# Patient Record
Sex: FEMALE | Race: BLACK OR AFRICAN AMERICAN | NOT HISPANIC OR LATINO | Employment: UNEMPLOYED | URBAN - METROPOLITAN AREA
[De-identification: names, ages, dates, MRNs, and addresses within clinical notes are randomized per-mention and may not be internally consistent; named-entity substitution may affect disease eponyms.]

---

## 2018-06-12 ENCOUNTER — APPOINTMENT (OUTPATIENT)
Dept: GENERAL RADIOLOGY | Facility: CLINIC | Age: 28
End: 2018-06-12
Attending: EMERGENCY MEDICINE
Payer: COMMERCIAL

## 2018-06-12 ENCOUNTER — HOSPITAL ENCOUNTER (EMERGENCY)
Facility: CLINIC | Age: 28
Discharge: HOME | End: 2018-06-12
Attending: EMERGENCY MEDICINE | Admitting: EMERGENCY MEDICINE
Payer: COMMERCIAL

## 2018-06-12 VITALS
BODY MASS INDEX: 34.66 KG/M2 | OXYGEN SATURATION: 97 % | TEMPERATURE: 98.8 F | DIASTOLIC BLOOD PRESSURE: 71 MMHG | RESPIRATION RATE: 18 BRPM | HEART RATE: 80 BPM | HEIGHT: 69 IN | WEIGHT: 234 LBS | SYSTOLIC BLOOD PRESSURE: 116 MMHG

## 2018-06-12 DIAGNOSIS — R07.9 ACUTE CHEST PAIN: ICD-10-CM

## 2018-06-12 LAB
ANION GAP SERPL CALCULATED.3IONS-SCNC: 9 MMOL/L (ref 3–14)
BASOPHILS # BLD AUTO: 0 10E9/L (ref 0–0.2)
BASOPHILS NFR BLD AUTO: 0.2 %
BUN SERPL-MCNC: 11 MG/DL (ref 7–30)
CALCIUM SERPL-MCNC: 8.4 MG/DL (ref 8.5–10.1)
CHLORIDE SERPL-SCNC: 108 MMOL/L (ref 94–109)
CO2 SERPL-SCNC: 24 MMOL/L (ref 20–32)
CREAT SERPL-MCNC: 0.83 MG/DL (ref 0.52–1.04)
D DIMER PPP FEU-MCNC: 0.3 UG/ML FEU (ref 0–0.5)
DIFFERENTIAL METHOD BLD: NORMAL
EOSINOPHIL # BLD AUTO: 0.1 10E9/L (ref 0–0.7)
EOSINOPHIL NFR BLD AUTO: 1.4 %
ERYTHROCYTE [DISTWIDTH] IN BLOOD BY AUTOMATED COUNT: 12.7 % (ref 10–15)
GFR SERPL CREATININE-BSD FRML MDRD: 83 ML/MIN/1.7M2
GLUCOSE SERPL-MCNC: 112 MG/DL (ref 70–99)
HCT VFR BLD AUTO: 36.9 % (ref 35–47)
HGB BLD-MCNC: 12.4 G/DL (ref 11.7–15.7)
IMM GRANULOCYTES # BLD: 0 10E9/L (ref 0–0.4)
IMM GRANULOCYTES NFR BLD: 0.4 %
INTERPRETATION ECG - MUSE: NORMAL
LYMPHOCYTES # BLD AUTO: 3.1 10E9/L (ref 0.8–5.3)
LYMPHOCYTES NFR BLD AUTO: 32 %
MCH RBC QN AUTO: 30 PG (ref 26.5–33)
MCHC RBC AUTO-ENTMCNC: 33.6 G/DL (ref 31.5–36.5)
MCV RBC AUTO: 89 FL (ref 78–100)
MONOCYTES # BLD AUTO: 1 10E9/L (ref 0–1.3)
MONOCYTES NFR BLD AUTO: 10.3 %
NEUTROPHILS # BLD AUTO: 5.3 10E9/L (ref 1.6–8.3)
NEUTROPHILS NFR BLD AUTO: 55.7 %
NRBC # BLD AUTO: 0 10*3/UL
NRBC BLD AUTO-RTO: 0 /100
PLATELET # BLD AUTO: 294 10E9/L (ref 150–450)
POTASSIUM SERPL-SCNC: 3.6 MMOL/L (ref 3.4–5.3)
RBC # BLD AUTO: 4.13 10E12/L (ref 3.8–5.2)
SODIUM SERPL-SCNC: 141 MMOL/L (ref 133–144)
TROPONIN I SERPL-MCNC: <0.015 UG/L (ref 0–0.04)
TROPONIN I SERPL-MCNC: <0.015 UG/L (ref 0–0.04)
WBC # BLD AUTO: 9.5 10E9/L (ref 4–11)

## 2018-06-12 PROCEDURE — 80048 BASIC METABOLIC PNL TOTAL CA: CPT | Performed by: EMERGENCY MEDICINE

## 2018-06-12 PROCEDURE — 25000132 ZZH RX MED GY IP 250 OP 250 PS 637: Performed by: EMERGENCY MEDICINE

## 2018-06-12 PROCEDURE — 71046 X-RAY EXAM CHEST 2 VIEWS: CPT

## 2018-06-12 PROCEDURE — 85025 COMPLETE CBC W/AUTO DIFF WBC: CPT | Performed by: EMERGENCY MEDICINE

## 2018-06-12 PROCEDURE — 99285 EMERGENCY DEPT VISIT HI MDM: CPT | Mod: 25

## 2018-06-12 PROCEDURE — 85379 FIBRIN DEGRADATION QUANT: CPT | Performed by: EMERGENCY MEDICINE

## 2018-06-12 PROCEDURE — 84484 ASSAY OF TROPONIN QUANT: CPT | Performed by: EMERGENCY MEDICINE

## 2018-06-12 PROCEDURE — 93005 ELECTROCARDIOGRAM TRACING: CPT

## 2018-06-12 RX ORDER — ASPIRIN 81 MG/1
324 TABLET, CHEWABLE ORAL ONCE
Status: COMPLETED | OUTPATIENT
Start: 2018-06-12 | End: 2018-06-12

## 2018-06-12 RX ADMIN — ASPIRIN 81 MG 324 MG: 81 TABLET ORAL at 19:08

## 2018-06-12 ASSESSMENT — ENCOUNTER SYMPTOMS
PALPITATIONS: 0
DIAPHORESIS: 0
NAUSEA: 0
LIGHT-HEADEDNESS: 0
VOMITING: 0
COUGH: 0

## 2018-06-12 NOTE — ED PROVIDER NOTES
History     Chief Complaint:  Chest pain    HPI   Zi Mirza is an otherwise healthy 27 year old female who presents with chest pain. The patient reports an onset of pleuritic, localized, and left sided-chest pain about 40 minutes ago. She states the pain feels sharp like a pinch or a needle in her chest. No known alleviating or exacerbating factors. Patient's pain has improved since she arrived to the emergency department but is currently still experiencing some pain. She did not take any medications prior to evaluation. Denies nausea, vomiting, lightheadedness, palpitations, diaphoresis, leg swelling, hemoptysis. Denies personal or familial history of blood clot or cancer. No history of heart disease. Of note, patient is on Nexplanon.     Cardiac/PE/DVT Risk Factors:  The patient does not have a history of hypertension, hyperlipidemia, diabetes, smoking. She reports no family history of cardiac disease. The patient doesn't have any personal or familial history of PE, DVT, or clotting disorder. Patient is currently on Nexplanon. The patient reports no recent travel, surgery, or other immobilizations.     Allergies:  No known drug allergies     Medications:    Nexplanon SC    Past Medical History:    The patient does not have any past pertinent medical history.     Past Surgical History:    History reviewed. No pertinent surgical history.     Family History:    History reviewed. No pertinent family history.      Social History:  Smoking status: Never smoker  Alcohol use: No   Marital Status:  Single [1]    Review of Systems   Constitutional: Negative for diaphoresis.   Respiratory: Negative for cough.    Cardiovascular: Positive for chest pain. Negative for palpitations and leg swelling.   Gastrointestinal: Negative for nausea and vomiting.   Neurological: Negative for light-headedness.   All other systems reviewed and are negative.    Physical Exam   Patient Vitals for the past 24 hrs:   BP Temp Temp src  "Heart Rate Resp SpO2 Height Weight   06/12/18 2000 114/59 - - 95 18 97 % - -   06/12/18 1825 142/82 98.8  F (37.1  C) Oral 98 18 98 % 1.753 m (5' 9\") 106.1 kg (234 lb)      Physical Exam  General: Well-nourished  Eyes: PERRL, conjunctivae pink no scleral icterus or conjunctival injection  ENT:  Moist mucus membranes, posterior oropharynx clear without erythema or exudates  Respiratory:  Lungs clear to auscultation bilaterally, no crackles/rubs/wheezes.  Good air movement  CV: Normal rate and rhythm, no murmurs/rubs/gallops  GI:  Abdomen soft and non-distended.  Normoactive BS.  No tenderness, guarding or rebound  Skin: Warm, dry.  No rashes or petechiae  Musculoskeletal: No peripheral edema or calf tenderness  Neuro: Alert and oriented to person/place/time  Psychiatric: Anxious affect      Emergency Department Course   ECG (18:28:16)  Indication: Chest pain.  Normal sinus rhythm with sinus arrhythmia. Normal ECG.  Agree with computer interpretation.   Read at 1828.   Rate 93 bpm. IA interval 152. QRS duration 82. QT/QTc 348/432. P-R-T axes 60 40 39.     Imaging:  Radiographic findings were communicated with the patient who voiced understanding of the findings.  Chest XR, PA & LAT  Heart and pulmonary vessels within normal limits. Lungs clear. No  pleural effusion.. The left apex is slightly more lucent than the  right. No pneumothorax. Possibly bullous change at the left apex.   As read by Radiology.     Laboratory:  CBC: WNL (WBC 9.5, HGB 12.4, )    BMP: Glucose 112 (H), Calcium 8.4 (L) WNL (Creatinine 0.83)   Troponin (1858): <0.015   D-dimer (1858): 0.3    Interventions:  1908: Aspirin chewable tablet 324 mg oral     Emergency Department Course:  1828: ECG obtained, findings as noted above.    Past medical records, nursing notes, and vitals reviewed.  1840: I performed an exam of the patient and obtained history, as documented above.   1858: IV inserted and blood drawn for basic laboratory. Troponin and " D-dimer obtained. Results as noted above.    1908: Patient given Aspirin here in the emergency department.  The patient was sent for a chest XR while in the emergency department, findings above.   2144: I rechecked the patient and explained the findings to the patient.   Patient discharged home with instructions regarding supportive care, medications, and reasons to return. The importance of close follow-up was reviewed.      Impression & Plan    Medical Decision Making:    HEART Score  Criteria   0-2 points for each of 5 items (maximum of 10 points):  Score 0- History slightly suspicious for coronary syndrome  Score 0- EKG Normal  Score 0- Age <45 years old  Score 0- No risk factors for atherosclerotic disease  Score 0- Within normal limits for troponin levels  Interpretation  Risk of adverse outcome  Heart Score: 0  Total Score 0-3- Adverse Outcome Risk 2.5% - Supports early discharge with appropriate follow-up    Zi Mirza is a 27 year old female presented with chest pain. Initial laboratory and imaging tests have come back normal.  Delta troponin is negative.  The patients symptoms have improved with interventions in the Emergency Department and are very atypical for coronary ischemia. Ddimer is negative and there is no clinical, laboratory, or radiographic evidence of pulmonary embolism, aortic dissection or cardiac ischemia.  Given the low HEART score, I feel the candidate is appropriate for discharge with outpatient PMD follow-up. She has agreed to follow-up with the stress test and primary doctor and return if any worsening.    An incidental finding of a possible bleb was noted on imaging.  I alerted the patient, gave copies of CXR results and discussed with her the possibility this could increase her risk for a spontaneous pneumothorax in the future.    Diagnosis:    ICD-10-CM   1. Acute chest pain R07.9     Disposition:  discharged to home    Tricia Lambert  6/12/2018    EMERGENCY DEPARTMENT  I, Tricia Lambert,  am serving as a scribe at 6:40 PM on 6/12/2018 to document services personally performed by Kimmy Candelaria MD based on my observations and the provider's statements to me.       Kimmy Candelaria MD  06/12/18 3460

## 2018-06-12 NOTE — ED AVS SNAPSHOT
Emergency Department    [address] [facility] [address]    Phone:  [phone]    Fax:  [phone]                                       Zi Mirza   MRN: [id number]    Department:   Emergency Department   Date of Visit:  6/12/2018           Patient Information     Date Of Birth          1990        Your diagnoses for this visit were:     Acute chest pain        You were seen by Kimmy Candelaria MD.      Follow-up Information     Follow up with Kolby Leahy MD. Schedule an appointment as soon as possible for a visit in 3 days.    Specialty:  Family Practice    Contact information:    [address] [facility] [address] [phone]          Discharge Instructions       *You may resume diet and activities as tolerated.  *No new medications.  *Follow-up with your doctor for a recheck in 2-3 days.   *Return if you develop difficulty in breathing, faint or feel like you will faint or become worse in any way.    Discharge Instructions  Chest Pain    You have been seen today for chest pain or discomfort.  At this time, your provider has found no signs that your chest pain is due to a serious or life-threatening condition, (or you have declined more testing and/or admission to the hospital). However, sometimes there is a serious problem that does not show up right away. Your evaluation today may not be complete and you may need further testing and evaluation.     Generally, every Emergency Department visit should have a follow-up clinic visit with either a primary or a specialty clinic/provider. Please follow-up as instructed by your emergency provider today.  Return to the Emergency Department if:    Your chest pain changes, gets worse, starts to happen more often, or comes with less activity.    You are newly short of breath.    You get very weak or tired.    You pass out or faint.    You have any new symptoms, like fever, cough, numb legs, or you cough up blood.    You have  anything else that worries you.    Until you follow-up with your regular provider, please do the following:    Take one aspirin daily unless you have an allergy or are told not to by your provider.    If a stress test appointment has been made, go to the appointment.    If you have questions, contact your regular provider.    Follow-up with your regular provider/clinic as directed; this is very important.    If you were given a prescription for medicine here today, be sure to read all of the information (including the package insert) that comes with your prescription.  This will include important information about the medicine, its side effects, and any warnings that you need to know about.  The pharmacist who fills the prescription can provide more information and answer questions you may have about the medicine.  If you have questions or concerns that the pharmacist cannot address, please call or return to the Emergency Department.       Remember that you can always come back to the Emergency Department if you are not able to see your regular provider in the amount of time listed above, if you get any new symptoms, or if there is anything that worries you.        24 Hour Appointment Hotline       To make an appointment at [facility], call [phone] ([phone]). If you don't have a family doctor or clinic, we will help you find one. [facility] are conveniently located to serve the needs of you and your family.             Review of your medicines      Our records show that you are taking the medicines listed below. If these are incorrect, please call your family doctor or clinic.        Dose / Directions Last dose taken    NEXPLANON SC        Refills:  0        UNABLE TO FIND        MEDICATION NAME: CLA   Refills:  0                Procedures and tests performed during your visit     Basic metabolic panel    CBC with platelets differential    Cardiac Continuous Monitoring    Chest XR,  PA &  LAT    D dimer quantitative    EKG 12 lead    Peripheral IV: Standard    Pulse oximetry nursing    Review medications with patient    Troponin I    Troponin I (now)    Vital signs      Orders Needing Specimen Collection     None      Pending Results     No orders found from 6/10/2018 to 6/13/2018.            Pending Culture Results     No orders found from 6/10/2018 to 6/13/2018.            Pending Results Instructions     If you had any lab results that were not finalized at the time of your Discharge, you can call the ED Lab Result RN at [phone]. You will be contacted by this team for any positive Lab results or changes in treatment. The nurses are available 7 days a week from 10A to 6:30P.  You can leave a message 24 hours per day and they will return your call.        Test Results From Your Hospital Stay        6/12/2018  7:06 PM      Component Results     Component Value Ref Range & Units Status    WBC 9.5 4.0 - 11.0 10e9/L Final    RBC Count 4.13 3.8 - 5.2 10e12/L Final    Hemoglobin 12.4 11.7 - 15.7 g/dL Final    Hematocrit 36.9 35.0 - 47.0 % Final    MCV 89 78 - 100 fl Final    MCH 30.0 26.5 - 33.0 pg Final    MCHC 33.6 31.5 - 36.5 g/dL Final    RDW 12.7 10.0 - 15.0 % Final    Platelet Count 294 150 - 450 10e9/L Final    Diff Method Automated Method  Final    % Neutrophils 55.7 % Final    % Lymphocytes 32.0 % Final    % Monocytes 10.3 % Final    % Eosinophils 1.4 % Final    % Basophils 0.2 % Final    % Immature Granulocytes 0.4 % Final    Nucleated RBCs 0 0 /100 Final    Absolute Neutrophil 5.3 1.6 - 8.3 10e9/L Final    Absolute Lymphocytes 3.1 0.8 - 5.3 10e9/L Final    Absolute Monocytes 1.0 0.0 - 1.3 10e9/L Final    Absolute Eosinophils 0.1 0.0 - 0.7 10e9/L Final    Absolute Basophils 0.0 0.0 - 0.2 10e9/L Final    Abs Immature Granulocytes 0.0 0 - 0.4 10e9/L Final    Absolute Nucleated RBC 0.0  Final         6/12/2018  7:58 PM      Component Results     Component Value Ref Range & Units Status     Sodium 141 133 - 144 mmol/L Final    Potassium 3.6 3.4 - 5.3 mmol/L Final    Chloride 108 94 - 109 mmol/L Final    Carbon Dioxide 24 20 - 32 mmol/L Final    Anion Gap 9 3 - 14 mmol/L Final    Glucose 112 (H) 70 - 99 mg/dL Final    Urea Nitrogen 11 7 - 30 mg/dL Final    Creatinine 0.83 0.52 - 1.04 mg/dL Final    GFR Estimate 83 >60 mL/min/1.7m2 Final    Non  GFR Calc    GFR Estimate If Black >90 >60 mL/min/1.7m2 Final    African American GFR Calc    Calcium 8.4 (L) 8.5 - 10.1 mg/dL Final         6/12/2018  7:58 PM      Component Results     Component Value Ref Range & Units Status    Troponin I ES <0.015 0.000 - 0.045 ug/L Final    The 99th percentile for upper reference range is 0.045 ug/L.  Troponin values   in the range of 0.045 - 0.120 ug/L may be associated with risks of adverse   clinical events.           6/12/2018  7:26 PM      Component Results     Component Value Ref Range & Units Status    D Dimer 0.3 0.0 - 0.50 ug/ml FEU Final    This D-dimer assay is intended for use in conjunction with a clinical pretest   probability assessment model to exclude pulmonary embolism (PE) and deep   venous thrombosis (DVT) in outpatients suspected of PE or DVT. The cut-off   value is 0.5 ug/mL FEU.           6/12/2018  9:11 PM      Narrative     CHEST TWO VIEWS  6/12/2018 8:50 PM     HISTORY: chest pain;     COMPARISON: None.        Impression     IMPRESSION:   Heart and pulmonary vessels within normal limits. Lungs clear. No  pleural effusion.. The left apex is slightly more lucent than the  right. No pneumothorax. Possibly bullous change at the left apex.     ISAK NARVAEZ MD         6/12/2018  9:45 PM      Component Results     Component Value Ref Range & Units Status    Troponin I ES <0.015 0.000 - 0.045 ug/L Final    The 99th percentile for upper reference range is 0.045 ug/L.  Troponin values   in the range of 0.045 - 0.120 ug/L may be associated with risks of adverse   clinical events.                   Clinical Quality Measure: Blood Pressure Screening     Your blood pressure was checked while you were in the emergency department today. The last reading we obtained was  BP: 114/59 . Please read the guidelines below about what these numbers mean and what you should do about them.  If your systolic blood pressure (the top number) is less than 120 and your diastolic blood pressure (the bottom number) is less than 80, then your blood pressure is normal. There is nothing more that you need to do about it.  If your systolic blood pressure (the top number) is 120-139 or your diastolic blood pressure (the bottom number) is 80-89, your blood pressure may be higher than it should be. You should have your blood pressure rechecked within a year by a primary care provider.  If your systolic blood pressure (the top number) is 140 or greater or your diastolic blood pressure (the bottom number) is 90 or greater, you may have high blood pressure. High blood pressure is treatable, but if left untreated over time it can put you at risk for heart attack, stroke, or kidney failure. You should have your blood pressure rechecked by a primary care provider within the next 4 weeks.  If your provider in the emergency department today gave you specific instructions to follow-up with your doctor or provider even sooner than that, you should follow that instruction and not wait for up to 4 weeks for your follow-up visit.        Thank you for choosing [facility]       Thank you for choosing [facility] for your care. Our goal is always to provide you with excellent care. Hearing back from our patients is one way we can continue to improve our services. Please take a few minutes to complete the written survey that you may receive in the mail after you visit with us. Thank you!        [employer] Information     [email or web address] lets you send messages to your doctor, view your test results, renew your prescriptions, schedule appointments and more. To sign  "up, go to [email or web address] . Click on \"Log in\" on the left side of the screen, which will take you to the Welcome page. Then click on \"Sign up Now\" on the right side of the page.     You will be asked to enter the access code listed below, as well as some personal information. Please follow the directions to create your username and password.     Your access code is: [email or web address]  Expires: 9/10/2018  9:50 PM     Your access code will  in 90 days. If you need help or a new code, please call your [facility] clinic or [phone].        Care EveryWhere ID     This is your Care EveryWhere ID. This could be used by other organizations to access your [facility] medical records  [id number]        Equal Access to Services     EARNESTINE SERVIN : Giselle Seymour, elizabeth dc, parveen marina, monica ramos. So [facility] [phone].    ATENCIÓN: Si habla español, tiene a tom disposición servicios gratuitos de asistencia lingüística. Omer al [phone].    We comply with applicable federal civil rights laws and [place] laws. We do not discriminate on the basis of race, color, national origin, age, disability, sex, sexual orientation, or gender identity.            After Visit Summary       This is your record. Keep this with you and show to your community pharmacist(s) and doctor(s) at your next visit.                  "

## 2018-06-12 NOTE — ED AVS SNAPSHOT
Emergency Department    [address] [place] [address]    Phone:  [phone]    Fax:  [phone]                                       Zi Mirza   MRN: [id number]    Department:   Emergency Department   Date of Visit:  6/12/2018           After Visit Summary Signature Page     I have received my discharge instructions, and my questions have been answered. I have discussed any challenges I see with this plan with the nurse or doctor.    ..........................................................................................................................................  Patient/Patient Representative Signature      ..........................................................................................................................................  Patient Representative Print Name and Relationship to Patient    ..................................................               ................................................  Date                                            Time    ..........................................................................................................................................  Reviewed by Signature/Title    ...................................................              ..............................................  Date                                                            Time

## 2018-06-13 NOTE — DISCHARGE INSTRUCTIONS
*You may resume diet and activities as tolerated.  *No new medications.  *Follow-up with your doctor for a recheck in 2-3 days.   *Return if you develop difficulty in breathing, faint or feel like you will faint or become worse in any way.    Discharge Instructions  Chest Pain    You have been seen today for chest pain or discomfort.  At this time, your provider has found no signs that your chest pain is due to a serious or life-threatening condition, (or you have declined more testing and/or admission to the hospital). However, sometimes there is a serious problem that does not show up right away. Your evaluation today may not be complete and you may need further testing and evaluation.     Generally, every Emergency Department visit should have a follow-up clinic visit with either a primary or a specialty clinic/provider. Please follow-up as instructed by your emergency provider today.  Return to the Emergency Department if:    Your chest pain changes, gets worse, starts to happen more often, or comes with less activity.    You are newly short of breath.    You get very weak or tired.    You pass out or faint.    You have any new symptoms, like fever, cough, numb legs, or you cough up blood.    You have anything else that worries you.    Until you follow-up with your regular provider, please do the following:    Take one aspirin daily unless you have an allergy or are told not to by your provider.    If a stress test appointment has been made, go to the appointment.    If you have questions, contact your regular provider.    Follow-up with your regular provider/clinic as directed; this is very important.    If you were given a prescription for medicine here today, be sure to read all of the information (including the package insert) that comes with your prescription.  This will include important information about the medicine, its side effects, and any warnings that you need to know about.  The pharmacist who  fills the prescription can provide more information and answer questions you may have about the medicine.  If you have questions or concerns that the pharmacist cannot address, please call or return to the Emergency Department.       Remember that you can always come back to the Emergency Department if you are not able to see your regular provider in the amount of time listed above, if you get any new symptoms, or if there is anything that worries you.

## 2018-11-22 ENCOUNTER — HOSPITAL ENCOUNTER (EMERGENCY)
Facility: CLINIC | Age: 28
Discharge: HOME | End: 2018-11-22
Attending: EMERGENCY MEDICINE | Admitting: EMERGENCY MEDICINE
Payer: COMMERCIAL

## 2018-11-22 VITALS
DIASTOLIC BLOOD PRESSURE: 89 MMHG | OXYGEN SATURATION: 98 % | SYSTOLIC BLOOD PRESSURE: 131 MMHG | WEIGHT: 230 LBS | RESPIRATION RATE: 16 BRPM | BODY MASS INDEX: 34.07 KG/M2 | HEART RATE: 90 BPM | TEMPERATURE: 99 F | HEIGHT: 69 IN

## 2018-11-22 DIAGNOSIS — J02.9 ACUTE PHARYNGITIS, UNSPECIFIED ETIOLOGY: ICD-10-CM

## 2018-11-22 LAB
DEPRECATED S PYO AG THROAT QL EIA: NORMAL
SPECIMEN SOURCE: NORMAL

## 2018-11-22 PROCEDURE — 87880 STREP A ASSAY W/OPTIC: CPT | Performed by: EMERGENCY MEDICINE

## 2018-11-22 PROCEDURE — 25000131 ZZH RX MED GY IP 250 OP 636 PS 637: Performed by: EMERGENCY MEDICINE

## 2018-11-22 PROCEDURE — 25000132 ZZH RX MED GY IP 250 OP 250 PS 637: Performed by: EMERGENCY MEDICINE

## 2018-11-22 PROCEDURE — 87081 CULTURE SCREEN ONLY: CPT | Performed by: EMERGENCY MEDICINE

## 2018-11-22 PROCEDURE — 99283 EMERGENCY DEPT VISIT LOW MDM: CPT

## 2018-11-22 RX ORDER — IBUPROFEN 600 MG/1
600 TABLET, FILM COATED ORAL EVERY 6 HOURS PRN
Qty: 30 TABLET | Refills: 0 | Status: SHIPPED | OUTPATIENT
Start: 2018-11-22 | End: 2018-11-25

## 2018-11-22 RX ORDER — IBUPROFEN 600 MG/1
600 TABLET, FILM COATED ORAL ONCE
Status: COMPLETED | OUTPATIENT
Start: 2018-11-22 | End: 2018-11-22

## 2018-11-22 RX ADMIN — DEXAMETHASONE 10 MG: 2 TABLET ORAL at 07:54

## 2018-11-22 RX ADMIN — IBUPROFEN 600 MG: 600 TABLET, FILM COATED ORAL at 07:54

## 2018-11-22 ASSESSMENT — ENCOUNTER SYMPTOMS
COUGH: 1
SORE THROAT: 1
FEVER: 0

## 2018-11-22 NOTE — ED AVS SNAPSHOT
Emergency Department    [address] [place] [address]    Phone:  [phone]    Fax:  [phone]                                       Zi Mirza   MRN: [id number]    Department:   Emergency Department   Date of Visit:  11/22/2018           After Visit Summary Signature Page     I have received my discharge instructions, and my questions have been answered. I have discussed any challenges I see with this plan with the nurse or doctor.    ..........................................................................................................................................  Patient/Patient Representative Signature      ..........................................................................................................................................  Patient Representative Print Name and Relationship to Patient    ..................................................               ................................................  Date                                   Time    ..........................................................................................................................................  Reviewed by Signature/Title    ...................................................              ..............................................  Date                                               Time          22EPIC Rev 08/18

## 2018-11-22 NOTE — ED PROVIDER NOTES
"  History     Chief Complaint:  Pharyngitis     HPI   Zi Mirza is an otherwise healthy 27 year old female who presents for evaluation of a sore throat. The patient reports for the past 3 days, she has been experiencing a sore throat and lozenges haven't been helping. She reports she has not been around any sick. The patient also complains of a cough, but denies any fevers. She denies any chance of pregnancy.     Allergies:  No known drug allergies    Medications:    Nexplanon     Past Medical History:    History reviewed. No pertinent past medical history.    Past Surgical History:    History reviewed. No pertinent surgical history.    Family History:    No family history on file.    Social History:  Smoking status: No  Alcohol use: No  Marital Status:  Single [1]     Review of Systems   Constitutional: Negative for fever.   HENT: Positive for sore throat.    Respiratory: Positive for cough.    All other systems reviewed and are negative.    Physical Exam     Patient Vitals for the past 24 hrs:   BP Temp Temp src Pulse Resp SpO2 Height Weight   11/22/18 0735 131/89 99  F (37.2  C) Oral 90 16 98 % 1.753 m (5' 9\") 104.3 kg (230 lb)       Physical Exam  General: Alert, interactive in mild distress  Head:  Scalp is atraumatic  Eyes:  The pupils are equal, round, and reactive to light    EOM's intact    No scleral icterus  ENT:      Nose:  The external nose is normal  Ears:  External ears are normal  Mouth/Throat: The oropharynx is normal. Mild pharyngeal erythema     Mucus membranes are moist       Neck:  Normal range of motion.      There is no rigidity.    Trachea is in the midline         CV:  Regular rate and rhythm    No murmur   Resp:  Breath sounds are clear bilaterally    Non-labored, no retractions or accessory muscle use     MS:  Normal strength in all 4 extremities  Skin:  Warm and dry, No rash or lesions noted.  Neuro: Strength 5/5 x4.   Psych:  Awake. Alert.  Normal affect.      Appropriate " interactions.    Emergency Department Course     Laboratory:  Rapid strep: Negative  Culture: in process    Interventions:  0754: Decadron 10 mg oral  0754: Ibuprofen 600 mg oral     Emergency Department Course:  Past medical records, nursing notes, and vitals reviewed.  0745: I performed an exam of the patient and obtained history, as documented above.    0803: I rechecked the patient. Findings and plan explained to the Patient. Patient discharged home with instructions regarding supportive care, medications, and reasons to return. The importance of close follow-up was reviewed.     Impression & Plan      Medical Decision Making:  Zi Mirza is a 27 year old female who presents with sore throat and clinical evidence of pharyngitis.  The rapid strep test is negative, and formal culture has been set up in the lab. There is no clinical evidence of peritonsillar abscess, retropharyngeal abscess, Lemierre's Syndrome, epiglottis, or Jcarlos's angina. The etiology is most likely viral.   I have recommended treatment with analgesics, and we will await formal culture results.  If the culture is positive, an ED physician will call the patient to initiate anti-microbial therapy. Return if increasing pain, change in voice, neck pain, vomiting, fever, or shortness of breath. Follow-up with primary physician if not improving in 3-5 days. Given her well appearance, I would not test further for other etiologies of serious bacterial infections.     Diagnosis:    ICD-10-CM    1. Acute pharyngitis, unspecified etiology J02.9      Disposition:  discharged to home    Discharge Medications:  New Prescriptions   IBUPROFEN (ADVIL/MOTRIN) 600 MG TABLET  Take 1 tablet (600 mg) by mouth every 6 hours as needed for moderate pain     Candice Beck  11/22/2018    EMERGENCY DEPARTMENT  Candice FOY am serving as a scribe at 7:45 AM on 11/22/2018 to document services personally performed by Zeb Hooks MD based on my  observations and the provider's statements to me.        Zeb Hooks MD  11/22/18 9749

## 2018-11-22 NOTE — ED AVS SNAPSHOT
Emergency Department    [address] [facility] [address]    Phone:  [phone]    Fax:  [phone]                                       Zi Mirza   MRN: [id number]    Department:   Emergency Department   Date of Visit:  11/22/2018           Patient Information     Date Of Birth          1990        Your diagnoses for this visit were:     Acute pharyngitis, unspecified etiology        You were seen by Zeb Hooks MD.      Follow-up Information     Follow up with No Ref-Primary, Physician In 1 week.    Why:  Primary Provider        Follow up with  Emergency Department.    Specialty:  EMERGENCY MEDICINE    Why:  As needed, If symptoms worsen    Contact information:    [address] [facility] [address] [phone]        Discharge Instructions       Discharge Instructions  Sore Throat  You were seen today for a sore throat.  Most (>80%) sore throats are caused by a virus. Antibiotics do not help with viral infections, but you can fight off the virus on your own.  In this case, your sore throat would be treated with medications for your pain and fever.    Strep throat is a kind of sore throat caused by Group A streptococcus bacteria.  This type of sore throat is treated with antibiotics.  If you had a rapid test done today for strep throat and it did not show infection, a culture is done in some cases. The culture can take several days to complete. If the culture shows you have strep throat, we will call you and get you a prescription for antibiotics. We will not contact you with a negative culture result.  Generally, every Emergency Department visit should have a follow-up clinic visit with either a primary or a specialty clinic/provider. Please follow-up as instructed by your emergency provider today.  Return to the Emergency Department if:    If you have difficulty breathing.    If you are drooling because you are unable to swallow.    You  become dehydrated due to difficulty drinking. Signs of dehydration include weakness, dry mouth, and urinating less than 3 times per day.    If you develop swelling of the neck or tongue.    If you develop a high fever with either severe or unusual headache or stiff neck.    Treatment:      Pain relief -- Non-prescription pain medications, such as Tylenol  (acetaminophen) or Motrin , Advil  (ibuprofen) are usually recommended for pain.  Do not use a medicine that you are allergic to, or if your provider has told you not to use it.    Soft or liquid diet. Concentrate on liquids to keep yourself hydrated. Cold liquids (popsicles, ice cream, etc.) may feel good on your throat.  If you were given a prescription for medicine here today, be sure to read all of the information (including the package insert) that comes with your prescription.  This will include important information about the medicine, its side effects, and any warnings that you need to know about.  The pharmacist who fills the prescription can provide more information and answer questions you may have about the medicine.  If you have questions or concerns that the pharmacist cannot address, please call or return to the Emergency Department.   Remember that you can always come back to the Emergency Department if you are not able to see your regular provider in the amount of time listed above, if you get any new symptoms, or if there is anything that worries you.    24 Hour Appointment Hotline       To make an appointment at any [facility], call [phone] ([phone]). If you don't have a family doctor or clinic, we will help you find one. [facility] clinics are conveniently located to serve the needs of you and your family.             Review of your medicines      START taking        Dose / Directions Last dose taken    ibuprofen 600 MG tablet   Commonly known as:  ADVIL/MOTRIN   Dose:  600 mg   Quantity:  30 tablet        Take 1 tablet (600 mg)  by mouth every 6 hours as needed for moderate pain   Refills:  0          Our records show that you are taking the medicines listed below. If these are incorrect, please call your family doctor or clinic.        Dose / Directions Last dose taken    NEXPLANON SC        Refills:  0        UNABLE TO FIND        MEDICATION NAME: CLA   Refills:  0                Prescriptions were sent or printed at these locations (1 Prescription)                   Other Prescriptions                Printed at Department/Unit printer (1 of 1)         ibuprofen (ADVIL/MOTRIN) 600 MG tablet                Procedures and tests performed during your visit     Beta strep group A culture    Rapid strep screen      Orders Needing Specimen Collection     None      Pending Results     Date and Time Order Name Status Description    11/22/2018 0741 Beta strep group A culture In process             Pending Culture Results     Date and Time Order Name Status Description    11/22/2018 0741 Beta strep group A culture In process             Pending Results Instructions     If you had any lab results that were not finalized at the time of your Discharge, you can call the ED Lab Result RN at [phone]. You will be contacted by this team for any positive Lab results or changes in treatment. The nurses are available 7 days a week from 10A to 6:30P.  You can leave a message 24 hours per day and they will return your call.        Test Results From Your Hospital Stay        11/22/2018  8:00 AM      Component Results     Component    Specimen Description    Throat    Rapid Strep A Screen    NEGATIVE: No Group A streptococcal antigen detected by immunoassay, await culture report.         11/22/2018  8:00 AM                Clinical Quality Measure: Blood Pressure Screening     Your blood pressure was checked while you were in the emergency department today. The last reading we obtained was  BP: 131/89 . Please read the guidelines below about what these  "numbers mean and what you should do about them.  If your systolic blood pressure (the top number) is less than 120 and your diastolic blood pressure (the bottom number) is less than 80, then your blood pressure is normal. There is nothing more that you need to do about it.  If your systolic blood pressure (the top number) is 120-139 or your diastolic blood pressure (the bottom number) is 80-89, your blood pressure may be higher than it should be. You should have your blood pressure rechecked within a year by a primary care provider.  If your systolic blood pressure (the top number) is 140 or greater or your diastolic blood pressure (the bottom number) is 90 or greater, you may have high blood pressure. High blood pressure is treatable, but if left untreated over time it can put you at risk for heart attack, stroke, or kidney failure. You should have your blood pressure rechecked by a primary care provider within the next 4 weeks.  If your provider in the emergency department today gave you specific instructions to follow-up with your doctor or provider even sooner than that, you should follow that instruction and not wait for up to 4 weeks for your follow-up visit.        Thank you for choosing [facility]       Thank you for choosing [facility] for your care. Our goal is always to provide you with excellent care. Hearing back from our patients is one way we can continue to improve our services. Please take a few minutes to complete the written survey that you may receive in the mail after you visit with us. Thank you!        [employer] Information     [employer] lets you send messages to your doctor, view your test results, renew your prescriptions, schedule appointments and more. To sign up, go to [email or web address] . Click on \"Log in\" on the left side of the screen, which will take you to the Welcome page. Then click on \"Sign up Now\" on the right side of the page.     You will be asked to enter the access code listed " below, as well as some personal information. Please follow the directions to create your username and password.     Your access code is: [email or web address]  Expires: 2019  8:13 AM     Your access code will  in 90 days. If you need help or a new code, please call your [facility] clinic or [phone].        Care EveryWhere ID     This is your Care EveryWhere ID. This could be used by other organizations to access your [facility] medical records  [id number]        Equal Access to Services     [facility] : Hadii patrick morris Sochucho, waaxda luqadaha, qaybta kaalmamario aderichard, monica spencer . So [facility] [phone].    ATENCIÓN: Si habla español, tiene a tom disposición servicios gratuitos de asistencia lingüística. Llame al [phone].    We comply with applicable federal civil rights laws and [place] laws. We do not discriminate on the basis of race, color, national origin, age, disability, sex, sexual orientation, or gender identity.            After Visit Summary       This is your record. Keep this with you and show to your community pharmacist(s) and doctor(s) at your next visit.

## 2018-11-22 NOTE — LETTER
November 22, 2018      To Whom It May Concern:      Zi SHERIDAN Yuki was seen in our Emergency Department today, 11/22/18.       Sincerely,        Gabby Manjarrez, RN, BSN, PHN  Zeb Hooks MD

## 2018-11-24 LAB
BACTERIA SPEC CULT: NORMAL
Lab: NORMAL
SPECIMEN SOURCE: NORMAL

## 2020-08-06 ENCOUNTER — HOSPITAL ENCOUNTER (EMERGENCY)
Facility: CLINIC | Age: 30
Discharge: LEFT BEFORE DISCHARGE | End: 2020-08-06
Admitting: EMERGENCY MEDICINE

## 2020-08-06 VITALS
SYSTOLIC BLOOD PRESSURE: 127 MMHG | DIASTOLIC BLOOD PRESSURE: 75 MMHG | TEMPERATURE: 98.2 F | BODY MASS INDEX: 33.97 KG/M2 | HEART RATE: 89 BPM | HEIGHT: 69 IN | RESPIRATION RATE: 18 BRPM | OXYGEN SATURATION: 98 %

## 2020-08-06 LAB
ALBUMIN UR-MCNC: NEGATIVE MG/DL
APPEARANCE UR: CLEAR
BILIRUB UR QL STRIP: NEGATIVE
COLOR UR AUTO: YELLOW
GLUCOSE UR STRIP-MCNC: NEGATIVE MG/DL
HCG UR QL: POSITIVE
HGB UR QL STRIP: ABNORMAL
KETONES UR STRIP-MCNC: NEGATIVE MG/DL
LEUKOCYTE ESTERASE UR QL STRIP: ABNORMAL
MUCOUS THREADS #/AREA URNS LPF: PRESENT /LPF
NITRATE UR QL: NEGATIVE
PH UR STRIP: 6.5 PH (ref 5–7)
RBC #/AREA URNS AUTO: 1 /HPF (ref 0–2)
SOURCE: ABNORMAL
SP GR UR STRIP: 1.02 (ref 1–1.03)
SQUAMOUS #/AREA URNS AUTO: 2 /HPF (ref 0–1)
UROBILINOGEN UR STRIP-MCNC: NORMAL MG/DL (ref 0–2)
WBC #/AREA URNS AUTO: 2 /HPF (ref 0–5)

## 2020-08-06 PROCEDURE — 40000268 ZZH STATISTIC NO CHARGES

## 2020-08-06 PROCEDURE — 81025 URINE PREGNANCY TEST: CPT | Performed by: EMERGENCY MEDICINE

## 2020-08-06 PROCEDURE — 81001 URINALYSIS AUTO W/SCOPE: CPT | Performed by: EMERGENCY MEDICINE

## 2020-08-06 NOTE — ED TRIAGE NOTES
8 wk pregnant, spotting started yesterday, and abd cramping X 3 days. Pt states she got pregnant shortly after her implant was removed in June.

## 2020-10-06 LAB
ABO + RH BLD: NORMAL
ABO + RH BLD: NORMAL
BLD GP AB SCN SERPL QL: NEGATIVE
C TRACH DNA SPEC QL PROBE+SIG AMP: NEGATIVE
CULTURE MICRO: NEGATIVE
HBV SURFACE AG SERPL QL IA: NORMAL
HCT VFR BLD AUTO: 31.9 %
HEMOGLOBIN: 10.7 G/DL (ref 11.7–15.7)
HIV 1+2 AB+HIV1 P24 AG SERPL QL IA: NORMAL
N GONORRHOEA DNA SPEC QL PROBE+SIG AMP: NEGATIVE
PLATELET # BLD AUTO: 239 10^9/L
RUBELLA ABY IGG: NORMAL
TREPONEMA ANTIBODIES: NORMAL

## 2020-11-19 ENCOUNTER — PRENATAL OFFICE VISIT (OUTPATIENT)
Dept: NURSING | Facility: CLINIC | Age: 30
End: 2020-11-19

## 2020-11-19 DIAGNOSIS — O30.049 DICHORIONIC DIAMNIOTIC TWIN PREGNANCY: Primary | ICD-10-CM

## 2020-11-19 PROCEDURE — 99207 PR NO CHARGE NURSE ONLY: CPT

## 2020-11-19 RX ORDER — FERROUS GLUCONATE 324(37.5)
TABLET ORAL 2 TIMES DAILY
COMMUNITY
End: 2021-03-11 | Stop reason: SINTOL

## 2020-11-19 RX ORDER — PNV NO.95/FERROUS FUM/FOLIC AC 28MG-0.8MG
TABLET ORAL
COMMUNITY
End: 2022-09-06

## 2020-11-19 RX ORDER — ASPIRIN 81 MG/1
81 TABLET, CHEWABLE ORAL DAILY
COMMUNITY
End: 2022-09-06

## 2020-11-19 RX ORDER — DOCUSATE SODIUM 100 MG/1
100 CAPSULE, LIQUID FILLED ORAL 2 TIMES DAILY PRN
COMMUNITY
End: 2021-03-11 | Stop reason: SINTOL

## 2020-11-19 SDOH — ECONOMIC STABILITY: INCOME INSECURITY: HOW HARD IS IT FOR YOU TO PAY FOR THE VERY BASICS LIKE FOOD, HOUSING, MEDICAL CARE, AND HEATING?: NOT ASKED

## 2020-11-19 SDOH — ECONOMIC STABILITY: TRANSPORTATION INSECURITY
IN THE PAST 12 MONTHS, HAS THE LACK OF TRANSPORTATION KEPT YOU FROM MEDICAL APPOINTMENTS OR FROM GETTING MEDICATIONS?: NOT ASKED

## 2020-11-19 SDOH — ECONOMIC STABILITY: TRANSPORTATION INSECURITY
IN THE PAST 12 MONTHS, HAS LACK OF TRANSPORTATION KEPT YOU FROM MEETINGS, WORK, OR FROM GETTING THINGS NEEDED FOR DAILY LIVING?: NOT ASKED

## 2020-11-19 SDOH — ECONOMIC STABILITY: FOOD INSECURITY: WITHIN THE PAST 12 MONTHS, YOU WORRIED THAT YOUR FOOD WOULD RUN OUT BEFORE YOU GOT MONEY TO BUY MORE.: NOT ASKED

## 2020-11-19 SDOH — ECONOMIC STABILITY: FOOD INSECURITY: WITHIN THE PAST 12 MONTHS, THE FOOD YOU BOUGHT JUST DIDN'T LAST AND YOU DIDN'T HAVE MONEY TO GET MORE.: NOT ASKED

## 2020-11-19 NOTE — NURSING NOTE
NPN nurse visit done over the phone. Pt will be given NPN folder and book at her upcoming appt.   Discussed optional screening available to assess chromosomal anomalies. Questions answered. Pt advised to call the clinic if she has any questions or concerns related to her pregnancy. Prenatal labs will be obtained at her upcoming appt. New prenatal visit scheduled on 12/1 with .    Pt is transferring care from [facility]. Records are in care everywhere.   Pt is pregnancy with di/di twins    19w4d    Last pap: 10/6/20 WNL        Patient supplied answers from flow sheet for:  Prenatal OB Questionnaire.  Past Medical History  Diabetes?: No  Hypertension : No  Heart disease, mitral valve prolapse or rheumatic fever?: No  An autoimmune disease such as lupus or rheumatoid arthritis?: No  Kidney disease or urinary tract infection?: No  Epilepsy, seizures or spells?: No  Migraine headaches?: (!) Yes  A stroke or loss of function or sensation?: No  Any other neurological problems?: No  Have you ever been treated for depression?: No  Are you having problems with crying spells or loss of self-esteem?: No  Have you ever required psychiatric care?: No  Have you ever had hepatitis, liver disease or jaundice?: No  Have you been treated for blood clots in your veins, deep vein thromosis, inflammation in the veins, thrombosis, phlebitis, pulmonary embolism or varicosities?: No  Have you had excessive bleeding after surgery or dental work?: No  Do you bleed more than other women after a cut or scratch?: No  Do you have a history of anemia?: (!) Yes  Have you ever had thyroid problems or taken thyroid medication?: No   Do you have any endocrine problems?: No  Have you ever been in a major accident or suffered serious trauma?: No  Within the last year, has anyone hit, slapped, kicked or otherwise hurt you?: No  In the last year, has anyone forced you to have sex when you didn't want to?: No    Past Medical History 2    Have you ever received a blood transfusion?: No  Would you refuse a blood transfusion if a doctor judged it to be medically necessary?: No   If you answered Yes, would you rather die than receive a blood transfusion?: No  If you answered Yes, is this for Anabaptist reasons?: No  Does anyone in your home smoke?: No  Do you use tobacco products?: No  Do you drink beer, wine or hard liquor?: No  Do you use any of the following: marijuana, speed, cocaine, heroin, hallucinogens or other drugs?: No   Is your blood type Rh negative?: No  Have you ever had abnormal antibodies in your blood?: No  Have you ever had asthma?: (!) Yes(allergy induced asthma)  Have you ever had tuberculosis?: No  Do you have any allergies to drugs or over-the-counter medications?: No  Allergies: Dust Mites, Aspartame, Ethanol, Venlafaxine, Hydrochloride, Sertraline: No  Have you had any breast problems?: No  Have you ever ?: (!) Yes  Have you had any gynecological surgical procedures such as cervical conization, a LEEP procedure, laser treatment, cryosurgery of the cervix or a dilation and curettage, etc?: No  Have you ever had any other surgical procedures?: No  Have you been hospitalized for a nonsurgical reason excluding normal delivery?: No  Have you ever had any anesthetic complications?: No  Have you ever had an abnormal pap smear?: No    Past Medical History (Continued)  Do you have a history of abnormalities of the uterus?: No  Did your mother take MARIA TERESA or any other hormones when she was pregnant with you?: No  Did it take you more than a year to become pregnant?: No  Have you ever been evaluated or treated for infertility?: No  Is there a history of medical problems in your family, which you feel may be important to this pregnancy?: No  Do you have any other problems we have not asked about which you feel may be important to this pregnancy?: No    Symptoms since last menstrual period  Do you have any of the following symptoms:  abdominal pain, blood in stools or urine, chest pain, shortness of breath, coughing or vomiting up blood, your heart racing or skipping beats, nausea and vomiting, pain on urination or vaginal discharge or bleed: No  Current medications, including over-the-counter medications, you are using? (If not applicable answer none): see med list  Will the patient be 35 years old or older at the time of delivery?: No    Has the patient, baby's father or anyone in either family had:  Thalassemia (Italian, Greek, Mediterranean or  background only) and an MCV result less than 80?: No  Neural tube defect such as meningomyelocele, spina bifida or anencephaly?: No  Congenital heart defect?: No  Down's Syndrome?: No  Francisco J-Sachs disease (Latter day, Cajun, [place]hi-[place])?: No  Sickle cell disease or trait ()?: No  Hemophilia or other inherited problems of blood?: No  Muscular dystrophy?: No  Cystic fibrosis?: No  Lois's chorea?: No  Mental retardation/autism?: No  If yes, was the person tested for fragile X?: No  Any other inherited genetic or chromosomal disorder?: No  Maternal metabolic disorder (e.g Insulin-dependent diabetes, PKU)?: No  A child with birth defects not listed above?: No  Recurrent pregnancy loss or stillbirth?: No   Has the patient had any medications/street drugs/alcohol since her last menstrual period?: No  Does the patient or baby's father have any other genetic risks?: No    Infection History   Do you object to being tested for Hepatitis B?: No  Do you object to being tested for HIV?: No   Do you feel that you are at high risk for coming in contact with the AIDS virus?: No  Have you ever been treated for tuberculosis?: No  Have you ever had a positive skin test for tuberculosis?: No  Do you live with someone who has tuberculosis?: No  Have you ever been exposed to tuberculosis?: No  Do you have genital herpes?: No  Does your partner have genital herpes?: No  Have you had a viral illness since  your last period?: No  Have you ever had gonorrhea, chlamydia, syphilis, venereal warts, trichomoniasis, pelvic inflammatory disease or any other sexually transmitted disease?: No  Do you know if you are a genital group B streptococcus carrier?: No  Have you had chicken pox/varicella?: (!) Yes   Have you been vaccinated against chicken Pox?: (!) Yes  Have you had any other infectious diseases?: No    Dasha ALEXANDER RN

## 2020-12-17 ENCOUNTER — PRENATAL OFFICE VISIT (OUTPATIENT)
Dept: OBGYN | Facility: CLINIC | Age: 30
End: 2020-12-17
Payer: COMMERCIAL

## 2020-12-17 VITALS — BODY MASS INDEX: 38.2 KG/M2 | SYSTOLIC BLOOD PRESSURE: 124 MMHG | WEIGHT: 258.7 LBS | DIASTOLIC BLOOD PRESSURE: 66 MMHG

## 2020-12-17 DIAGNOSIS — O30.049 DICHORIONIC DIAMNIOTIC TWIN PREGNANCY, ANTEPARTUM: Primary | ICD-10-CM

## 2020-12-17 PROCEDURE — 99207 PR FIRST OB VISIT: CPT | Performed by: OBSTETRICS & GYNECOLOGY

## 2020-12-17 RX ORDER — BREAST PUMP
1 EACH MISCELLANEOUS PRN
Qty: 1 EACH | Refills: 0 | Status: SHIPPED | OUTPATIENT
Start: 2020-12-17 | End: 2022-09-06

## 2020-12-17 NOTE — NURSING NOTE
"Chief Complaint   Patient presents with     Transferred OB Care     from [facility], patietn is 23 weeks 4 days, pregnant with Di/Di twins. No c/o VB, LoF, has had some cramping. Feeling FM daily.        Initial /66   Wt 117.3 kg (258 lb 11.2 oz)   LMP 2020 (Exact Date)   BMI 38.20 kg/m   Estimated body mass index is 38.2 kg/m  as calculated from the following:    Height as of 20: 1.753 m (5' 9\").    Weight as of this encounter: 117.3 kg (258 lb 11.2 oz).  BP completed using cuff size: large    Questioned patient about current smoking habits.  Pt. has never smoked.          The following HM Due: NONE    Kulwinder Mercedes CMA               "

## 2020-12-17 NOTE — PROGRESS NOTES
"This is a 29 year old female patient,   who presents for her first obstetrical visit in our clinic, [place] [facility]. This pregnancy is complicated by dichorionic diamniotic twin gestation--she notes that she has twin uncles and her  is a twin.    EDC 2021 by Previous US which makes her 23w4d  today. Her last US was a level II US for anatomy at 18-20 weeks. She has not had a follow up growth US since then. Discussed this today.    OB History    Para Term  AB Living   2 1 1 0 0 1   SAB TAB Ectopic Multiple Live Births   0 0 0 0 1      # Outcome Date GA Lbr Luis Antonio/2nd Weight Sex Delivery Anes PTL Lv   2 Current            1 Term 10/04/15 37w0d  3.629 kg (8 lb) M  EPI N BRYANNA      Name: Elfego       History of GDM: No,  PTL : No,  History of HTN in pregnancy: No,  Thrombocytopenia: No,  Shoulder dystocia: No,  Vacuum Extraction: No  PPH: unclear. She states she \"bled a lot\" according to her  and may have passed out. No transfusion.  3rd of 4th degree laceration: No.   Other complications: No      Since her last LMP she denies use of alcohol, tobacco and street drugs.    HISTORY:  Past Medical History:   Diagnosis Date     No pertinent past medical history      Past Surgical History:   Procedure Laterality Date     NO HISTORY OF SURGERY       Family History   Problem Relation Age of Onset     Hypertension Mother      Asthma Father      No Known Problems Sister      No Known Problems Brother      Diabetes Type 1 Maternal Grandmother      Bone Cancer Maternal Grandfather      Cerebrovascular Disease Paternal Grandmother      No Known Problems Sister      No Known Problems Sister      Hypertension Sister      No Known Problems Brother      Social History     Socioeconomic History     Marital status:      Spouse name: Eriberto     Number of children: 1     Years of education: None     Highest education level: None   Occupational History     Occupation: self employed "   Social Needs     Financial resource strain: None     Food insecurity     Worry: None     Inability: None     Transportation needs     Medical: None     Non-medical: None   Tobacco Use     Smoking status: Never Smoker     Smokeless tobacco: Never Used   Substance and Sexual Activity     Alcohol use: No     Comment: rare     Drug use: No     Sexual activity: Yes     Partners: Male   Lifestyle     Physical activity     Days per week: None     Minutes per session: None     Stress: None   Relationships     Social connections     Talks on phone: None     Gets together: None     Attends Cheondo[employer] service: None     Active member of club or organization: None     Attends meetings of clubs or organizations: None     Relationship status: None     Intimate partner violence     Fear of current or ex partner: None     Emotionally abused: None     Physically abused: None     Forced sexual activity: None   Other Topics Concern     None   Social History Narrative     None     Current Outpatient Medications   Medication Sig     aspirin (ASA) 81 MG chewable tablet Take 81 mg by mouth daily     Misc. Devices (BREAST PUMP) MISC 1 each as needed     Prenatal Vit-Fe Fumarate-FA (PRENATAL VITAMIN) 27-0.8 MG TABS      docusate sodium (COLACE) 100 MG capsule Take 100 mg by mouth 2 times daily as needed for constipation     Ferrous Gluconate 324 (37.5 Fe) MG TABS Take by mouth 2 times daily     No current facility-administered medications for this visit.      No Known Allergies    Past medical, surgical, social and family history were reviewed and updated in EPIC.    ROS:   12 point review of systems negative other than symptoms noted below.    EXAM:  /66   Wt 117.3 kg (258 lb 11.2 oz)   LMP 07/08/2020 (Exact Date)   BMI 38.20 kg/m     BMI: Body mass index is 38.2 kg/m .    EXAM:  Constitutional: Appearance: Well nourished, well developed alert, in no acute distress  Chest:  Respiratory Effort:  Breathing unlabored  Abdominal  Examination:  Abdomen nontender to palpation, tone normal without     rigidity or guarding, no masses present, umbilicus without lesions    Size greater than dates (normal for twins) FHTs auscultated at 150s for both twins.  Skin:  General Inspection:  No rashes present, no lesions present, no areas of  discoloration.  Neurologic/Psychiatric:    Mental Status:  Oriented X3       ASSESSMENT:      ICD-10-CM    1. Dichorionic diamniotic twin pregnancy, antepartum  O30.049 Misc. Devices (BREAST PUMP) MISC       PLAN:    Prenatal labs and outside records all reviewed.   She declined genetic screening, saw genetic counselor earlier in pregnancy.    She is on baby ASA daily for preeclampsia prevention.    Discussed twin pregnancy, increased risk for  labor, gestational diabetes, and hypertensive disorders of pregnancy. Discussed the need for periodic growth scans after 20 weeks. Briefly discussed delivery options as well. All questions answered.    She prefers to complete a 3 hr glucose tolerance test rather than a GCT. This was ordered and can be done with her next visit here. She prefers vaginal delivery if appropriate at the time of labor.     Follow up in 4 weeks. She is encouraged to call sooner with questions or concerns.      Melany Albright MD

## 2020-12-18 ENCOUNTER — TRANSCRIBE ORDERS (OUTPATIENT)
Dept: MATERNAL FETAL MEDICINE | Facility: CLINIC | Age: 30
End: 2020-12-18

## 2020-12-18 DIAGNOSIS — O26.90 PREGNANCY RELATED CONDITION, ANTEPARTUM: Primary | ICD-10-CM

## 2020-12-23 ENCOUNTER — PRE VISIT (OUTPATIENT)
Dept: MATERNAL FETAL MEDICINE | Facility: CLINIC | Age: 30
End: 2020-12-23

## 2020-12-30 ENCOUNTER — HOSPITAL ENCOUNTER (OUTPATIENT)
Dept: ULTRASOUND IMAGING | Facility: CLINIC | Age: 30
End: 2020-12-30
Attending: OBSTETRICS & GYNECOLOGY
Payer: COMMERCIAL

## 2020-12-30 ENCOUNTER — OFFICE VISIT (OUTPATIENT)
Dept: MATERNAL FETAL MEDICINE | Facility: CLINIC | Age: 30
End: 2020-12-30
Attending: OBSTETRICS & GYNECOLOGY
Payer: COMMERCIAL

## 2020-12-30 DIAGNOSIS — O30.049 DICHORIONIC DIAMNIOTIC TWIN PREGNANCY, ANTEPARTUM: Primary | ICD-10-CM

## 2020-12-30 DIAGNOSIS — O26.90 PREGNANCY RELATED CONDITION, ANTEPARTUM: ICD-10-CM

## 2020-12-30 DIAGNOSIS — O40.9XX0 POLYHYDRAMNIOS: ICD-10-CM

## 2020-12-30 DIAGNOSIS — O40.9XX0 POLYHYDRAMNIOS AFFECTING PREGNANCY: ICD-10-CM

## 2020-12-30 PROCEDURE — 76812 OB US DETAILED ADDL FETUS: CPT | Mod: 26 | Performed by: OBSTETRICS & GYNECOLOGY

## 2020-12-30 PROCEDURE — 76811 OB US DETAILED SNGL FETUS: CPT

## 2020-12-30 PROCEDURE — 76811 OB US DETAILED SNGL FETUS: CPT | Mod: 26 | Performed by: OBSTETRICS & GYNECOLOGY

## 2020-12-30 NOTE — PROGRESS NOTES
"Please see \"Imaging\" tab under \"Chart Review\" for details of today's US.    Neli Betancourt, DO    " no

## 2021-01-05 ENCOUNTER — ALLIED HEALTH/NURSE VISIT (OUTPATIENT)
Dept: NURSING | Facility: CLINIC | Age: 31
End: 2021-01-05
Payer: COMMERCIAL

## 2021-01-05 DIAGNOSIS — O30.042 DICHORIONIC DIAMNIOTIC TWIN PREGNANCY IN SECOND TRIMESTER: Primary | ICD-10-CM

## 2021-01-05 LAB
ERYTHROCYTE [DISTWIDTH] IN BLOOD BY AUTOMATED COUNT: 13.7 % (ref 10–15)
HCT VFR BLD AUTO: 31.1 % (ref 35–47)
HGB BLD-MCNC: 10 G/DL (ref 11.7–15.7)
MCH RBC QN AUTO: 29.9 PG (ref 26.5–33)
MCHC RBC AUTO-ENTMCNC: 32.2 G/DL (ref 31.5–36.5)
MCV RBC AUTO: 93 FL (ref 78–100)
PLATELET # BLD AUTO: 218 10E9/L (ref 150–450)
RBC # BLD AUTO: 3.34 10E12/L (ref 3.8–5.2)
T PALLIDUM AB SER QL: NONREACTIVE
WBC # BLD AUTO: 8 10E9/L (ref 4–11)

## 2021-01-05 PROCEDURE — 82950 GLUCOSE TEST: CPT | Performed by: OBSTETRICS & GYNECOLOGY

## 2021-01-05 PROCEDURE — 99000 SPECIMEN HANDLING OFFICE-LAB: CPT | Performed by: OBSTETRICS & GYNECOLOGY

## 2021-01-05 PROCEDURE — 86780 TREPONEMA PALLIDUM: CPT | Mod: 90 | Performed by: OBSTETRICS & GYNECOLOGY

## 2021-01-05 PROCEDURE — 36415 COLL VENOUS BLD VENIPUNCTURE: CPT | Performed by: OBSTETRICS & GYNECOLOGY

## 2021-01-05 PROCEDURE — 99207 PR NO CHARGE NURSE ONLY: CPT

## 2021-01-05 PROCEDURE — 85027 COMPLETE CBC AUTOMATED: CPT | Performed by: OBSTETRICS & GYNECOLOGY

## 2021-01-06 DIAGNOSIS — R73.09 ABNORMAL GLUCOSE TOLERANCE TEST: Primary | ICD-10-CM

## 2021-01-06 LAB — GLUCOSE 1H P 50 G GLC PO SERPL-MCNC: 134 MG/DL (ref 60–129)

## 2021-01-06 NOTE — RESULT ENCOUNTER NOTE
Abnormal one hour glucose tolerance test; please order and help her schedule 3 hr glucose tolerance test.    Also let her know: Your hemoglobin, a measure of the iron in your red blood cells, is a low. This is common in pregnancy, but if you are not taking your prenatal regularly, I would restart that. If you are taking it, I would add either some iron-rich foods (lean meats, especially lean red meats, iron-fortified cereal, alcy Total brand cereals, green leafy vegetables like spinach) or consider taking extra iron. Iron supplements are available at the drug store--I recommend ferrous sulfate 325 mg daily. This can be constipating as well, so a good stool softener should be used with iron supplements.    As always, please call for any questions or concerns.    MD Melany Barillas MD

## 2021-01-20 ENCOUNTER — OFFICE VISIT (OUTPATIENT)
Dept: MATERNAL FETAL MEDICINE | Facility: CLINIC | Age: 31
End: 2021-01-20
Attending: OBSTETRICS & GYNECOLOGY
Payer: COMMERCIAL

## 2021-01-20 ENCOUNTER — HOSPITAL ENCOUNTER (OUTPATIENT)
Dept: ULTRASOUND IMAGING | Facility: CLINIC | Age: 31
End: 2021-01-20
Attending: OBSTETRICS & GYNECOLOGY
Payer: COMMERCIAL

## 2021-01-20 DIAGNOSIS — O30.049 DICHORIONIC DIAMNIOTIC TWIN PREGNANCY, ANTEPARTUM: ICD-10-CM

## 2021-01-20 DIAGNOSIS — O40.9XX0 POLYHYDRAMNIOS AFFECTING PREGNANCY: ICD-10-CM

## 2021-01-20 DIAGNOSIS — O40.9XX0 POLYHYDRAMNIOS AFFECTING PREGNANCY: Primary | ICD-10-CM

## 2021-01-20 PROCEDURE — 76816 OB US FOLLOW-UP PER FETUS: CPT | Mod: 26 | Performed by: OBSTETRICS & GYNECOLOGY

## 2021-01-20 PROCEDURE — 76816 OB US FOLLOW-UP PER FETUS: CPT | Mod: 59

## 2021-01-21 NOTE — PROGRESS NOTES
Please see ultrasound report under Imaging tab for details of today's ultrasound.    Rukhsana Alexander MD  Maternal-Fetal Medicine

## 2021-01-22 ENCOUNTER — PRENATAL OFFICE VISIT (OUTPATIENT)
Dept: OBGYN | Facility: CLINIC | Age: 31
End: 2021-01-22
Payer: COMMERCIAL

## 2021-01-22 VITALS — DIASTOLIC BLOOD PRESSURE: 62 MMHG | SYSTOLIC BLOOD PRESSURE: 128 MMHG | BODY MASS INDEX: 38.99 KG/M2 | WEIGHT: 264 LBS

## 2021-01-22 DIAGNOSIS — R73.09 ABNORMAL GLUCOSE TOLERANCE TEST: ICD-10-CM

## 2021-01-22 DIAGNOSIS — Z23 NEED FOR TDAP VACCINATION: Primary | ICD-10-CM

## 2021-01-22 PROCEDURE — 99207 PR PRENATAL VISIT: CPT | Performed by: OBSTETRICS & GYNECOLOGY

## 2021-01-22 PROCEDURE — 36415 COLL VENOUS BLD VENIPUNCTURE: CPT | Performed by: OBSTETRICS & GYNECOLOGY

## 2021-01-22 PROCEDURE — 82952 GTT-ADDED SAMPLES: CPT | Performed by: OBSTETRICS & GYNECOLOGY

## 2021-01-22 PROCEDURE — 90715 TDAP VACCINE 7 YRS/> IM: CPT | Performed by: OBSTETRICS & GYNECOLOGY

## 2021-01-22 PROCEDURE — 90471 IMMUNIZATION ADMIN: CPT | Performed by: OBSTETRICS & GYNECOLOGY

## 2021-01-22 PROCEDURE — 82951 GLUCOSE TOLERANCE TEST (GTT): CPT | Performed by: OBSTETRICS & GYNECOLOGY

## 2021-01-22 NOTE — NURSING NOTE
"Chief Complaint   Patient presents with     Prenatal Care       Initial /62   Wt 119.7 kg (264 lb)   LMP 2020 (Exact Date)   Breastfeeding No   BMI 38.99 kg/m   Estimated body mass index is 38.99 kg/m  as calculated from the following:    Height as of 20: 1.753 m (5' 9\").    Weight as of this encounter: 119.7 kg (264 lb).  BP completed using cuff size: regular    Questioned patient about current smoking habits.  Pt. has never smoked.          28w5d  + FM daily  + ginna kaur contractions  - bleeding or leaking of fluid  - headache   + heartburn   Lisa Cotton LPN               "

## 2021-01-22 NOTE — PROGRESS NOTES
PROBLEM LIST  LABS: Opos/RI    1. Di-di twins (girls--no one knows but patient and ): both babies >90%tile  High one hr glucose tolerance test, doing 3 hr today.  Poly twin B--see MFM notes.  Follow up in 2 weeks. Discussed delivery at 38 weeks, will make a plan at 35 weeks or so regarding mode of delivery based on size, status, position.    Melany Albright MD

## 2021-01-23 LAB
GLUCOSE 1H P 100 G GLC PO SERPL-MCNC: 145 MG/DL (ref 60–179)
GLUCOSE 2H P 100 G GLC PO SERPL-MCNC: 118 MG/DL (ref 60–154)
GLUCOSE 3H P 100 G GLC PO SERPL-MCNC: 109 MG/DL (ref 60–139)
GLUCOSE P FAST SERPL-MCNC: 87 MG/DL (ref 60–94)

## 2021-02-03 ENCOUNTER — OFFICE VISIT (OUTPATIENT)
Dept: MATERNAL FETAL MEDICINE | Facility: CLINIC | Age: 31
End: 2021-02-03
Attending: OBSTETRICS & GYNECOLOGY
Payer: COMMERCIAL

## 2021-02-03 ENCOUNTER — HOSPITAL ENCOUNTER (OUTPATIENT)
Dept: ULTRASOUND IMAGING | Facility: CLINIC | Age: 31
End: 2021-02-03
Attending: OBSTETRICS & GYNECOLOGY
Payer: COMMERCIAL

## 2021-02-03 DIAGNOSIS — O30.049 DICHORIONIC DIAMNIOTIC TWIN PREGNANCY, ANTEPARTUM: Primary | ICD-10-CM

## 2021-02-03 DIAGNOSIS — O40.9XX0 POLYHYDRAMNIOS AFFECTING PREGNANCY: ICD-10-CM

## 2021-02-03 PROCEDURE — 76815 OB US LIMITED FETUS(S): CPT | Mod: 26 | Performed by: OBSTETRICS & GYNECOLOGY

## 2021-02-03 PROCEDURE — 76815 OB US LIMITED FETUS(S): CPT

## 2021-02-03 NOTE — PROGRESS NOTES
"Please see \"Imaging\" tab under \"Chart Review\" for details of today's visit.    Aracelis Knight    "

## 2021-02-05 ENCOUNTER — PRENATAL OFFICE VISIT (OUTPATIENT)
Dept: OBGYN | Facility: CLINIC | Age: 31
End: 2021-02-05
Payer: COMMERCIAL

## 2021-02-05 VITALS — BODY MASS INDEX: 39.87 KG/M2 | WEIGHT: 270 LBS | SYSTOLIC BLOOD PRESSURE: 110 MMHG | DIASTOLIC BLOOD PRESSURE: 70 MMHG

## 2021-02-05 DIAGNOSIS — O30.049 DICHORIONIC DIAMNIOTIC TWIN PREGNANCY, ANTEPARTUM: Primary | ICD-10-CM

## 2021-02-05 DIAGNOSIS — O40.3XX2 POLYHYDRAMNIOS IN THIRD TRIMESTER COMPLICATION, FETUS 2 OF MULTIPLE GESTATION: ICD-10-CM

## 2021-02-05 DIAGNOSIS — Z34.80 SUPERVISION OF OTHER NORMAL PREGNANCY, ANTEPARTUM: ICD-10-CM

## 2021-02-05 PROCEDURE — 99207 PR PRENATAL VISIT: CPT | Performed by: OBSTETRICS & GYNECOLOGY

## 2021-02-05 NOTE — PROGRESS NOTES
30 year old  at 30w5d     LABS: Opos/RI.  S/p TDaP     1. Di-di twins (girls--no one knows but patient and ): both babies >90%tile  Failed GCT, passed GTT.  Poly twin B.  Has follow-up US on  with MFM.  Follow up in 2 weeks. Reviewed delivery at 38 weeks, will make a plan at 35 weeks or so regarding mode of delivery based on size, status, position.    RTC 2 wks     Alma Nicholas MD, MPH  [facility]

## 2021-02-05 NOTE — NURSING NOTE
"Chief Complaint   Patient presents with     Prenatal Care     30 5/7 weeks Di Di twins       Initial /70   Wt 122.5 kg (270 lb)   LMP 2020 (Exact Date)   BMI 39.87 kg/m   Estimated body mass index is 39.87 kg/m  as calculated from the following:    Height as of 20: 1.753 m (5' 9\").    Weight as of this encounter: 122.5 kg (270 lb).  BP completed using cuff size: large    Questioned patient about current smoking habits.  Pt. has never smoked.          The following HM Due: NONE  +fetal movement  -swelling    Marilyn Contreras, CMA    "

## 2021-02-17 ENCOUNTER — OFFICE VISIT (OUTPATIENT)
Dept: MATERNAL FETAL MEDICINE | Facility: CLINIC | Age: 31
End: 2021-02-17
Attending: OBSTETRICS & GYNECOLOGY
Payer: COMMERCIAL

## 2021-02-17 ENCOUNTER — HOSPITAL ENCOUNTER (OUTPATIENT)
Dept: ULTRASOUND IMAGING | Facility: CLINIC | Age: 31
End: 2021-02-17
Attending: OBSTETRICS & GYNECOLOGY
Payer: COMMERCIAL

## 2021-02-17 DIAGNOSIS — O40.9XX0 POLYHYDRAMNIOS AFFECTING PREGNANCY: ICD-10-CM

## 2021-02-17 DIAGNOSIS — O30.049 DICHORIONIC DIAMNIOTIC TWIN PREGNANCY, ANTEPARTUM: ICD-10-CM

## 2021-02-17 DIAGNOSIS — O36.63X2: ICD-10-CM

## 2021-02-17 DIAGNOSIS — O30.049 DICHORIONIC DIAMNIOTIC TWIN PREGNANCY, ANTEPARTUM: Primary | ICD-10-CM

## 2021-02-17 DIAGNOSIS — O40.3XX2 POLYHYDRAMNIOS IN THIRD TRIMESTER COMPLICATION, FETUS 2 OF MULTIPLE GESTATION: ICD-10-CM

## 2021-02-17 PROCEDURE — 76816 OB US FOLLOW-UP PER FETUS: CPT | Mod: 59

## 2021-02-17 PROCEDURE — 76816 OB US FOLLOW-UP PER FETUS: CPT | Mod: 26 | Performed by: OBSTETRICS & GYNECOLOGY

## 2021-02-17 NOTE — PROGRESS NOTES
"Please see \"Imaging\" tab under \"Chart Review\" for details of today's visit.    Aracelis Knight    " Assumed care of patient around 0730  POD 1 wedge resection. Pt a/o x 4, 2L nasal cannula O2 sats >92%, NSR on tele monitor. Lungs diminished bilaterally. CT to left lateral chest, occlusive dressing clean/dry/intact.    Small air leak noted, draining

## 2021-02-19 ENCOUNTER — PRENATAL OFFICE VISIT (OUTPATIENT)
Dept: OBGYN | Facility: CLINIC | Age: 31
End: 2021-02-19
Payer: COMMERCIAL

## 2021-02-19 VITALS — BODY MASS INDEX: 40.17 KG/M2 | WEIGHT: 272 LBS | DIASTOLIC BLOOD PRESSURE: 60 MMHG | SYSTOLIC BLOOD PRESSURE: 114 MMHG

## 2021-02-19 DIAGNOSIS — O30.049 DICHORIONIC DIAMNIOTIC TWIN PREGNANCY, ANTEPARTUM: Primary | ICD-10-CM

## 2021-02-19 PROCEDURE — 99207 PR PRENATAL VISIT: CPT | Performed by: OBSTETRICS & GYNECOLOGY

## 2021-02-19 NOTE — NURSING NOTE
"Chief Complaint   Patient presents with     Prenatal Care       Initial /60   Wt 123.4 kg (272 lb)   LMP 2020 (Exact Date)   Breastfeeding No   BMI 40.17 kg/m   Estimated body mass index is 40.17 kg/m  as calculated from the following:    Height as of 20: 1.753 m (5' 9\").    Weight as of this encounter: 123.4 kg (272 lb).  BP completed using cuff size: regular    Questioned patient about current smoking habits.  Pt. has never smoked.          32w5d  + FM daily  + ginna kaur   - bleeding or leaking of fluid  - headache   + mild heartburn  + mild edema  Lisa Cotton LPN               "

## 2021-02-19 NOTE — PROGRESS NOTES
PROBLEM LIST  LABS: Opos/RI    1. Di-di twins (girls--no one knows but patient and ): both babies >90%tile  High one hr glucose tolerance test, 3 hr within normal limits.  Poly twin B--see MFM notes.  Follow up in 2 weeks. Discussed delivery at 38 weeks, will make a plan at 35 weeks or so regarding mode of delivery based on size, status, position. At this point, if both remain cephalic, would plan vaginal delivery, but would not recommend attempt at breech extraction of the second twin due to size of both and size of B relative to A.  Group B Strep at 34-35 weeks, with cervix check.    Melany Albright MD

## 2021-03-02 ENCOUNTER — HOSPITAL ENCOUNTER (OUTPATIENT)
Facility: CLINIC | Age: 31
Discharge: HOME | End: 2021-03-02
Attending: OBSTETRICS & GYNECOLOGY | Admitting: OBSTETRICS & GYNECOLOGY
Payer: COMMERCIAL

## 2021-03-02 ENCOUNTER — TELEPHONE (OUTPATIENT)
Dept: OBGYN | Facility: CLINIC | Age: 31
End: 2021-03-02

## 2021-03-02 ENCOUNTER — PRENATAL OFFICE VISIT (OUTPATIENT)
Dept: OBGYN | Facility: CLINIC | Age: 31
End: 2021-03-02
Payer: COMMERCIAL

## 2021-03-02 VITALS — RESPIRATION RATE: 16 BRPM | TEMPERATURE: 97.7 F | SYSTOLIC BLOOD PRESSURE: 132 MMHG | DIASTOLIC BLOOD PRESSURE: 77 MMHG

## 2021-03-02 VITALS — BODY MASS INDEX: 40.76 KG/M2 | SYSTOLIC BLOOD PRESSURE: 136 MMHG | DIASTOLIC BLOOD PRESSURE: 70 MMHG | WEIGHT: 276 LBS

## 2021-03-02 DIAGNOSIS — O30.049 DICHORIONIC DIAMNIOTIC TWIN PREGNANCY, ANTEPARTUM: Primary | ICD-10-CM

## 2021-03-02 PROBLEM — Z36.89 ENCOUNTER FOR TRIAGE IN PREGNANT PATIENT: Status: ACTIVE | Noted: 2021-03-02

## 2021-03-02 LAB
ALBUMIN UR-MCNC: 20 MG/DL
APPEARANCE UR: ABNORMAL
BACTERIA #/AREA URNS HPF: ABNORMAL /HPF
BILIRUB UR QL STRIP: NEGATIVE
COLOR UR AUTO: YELLOW
GLUCOSE UR STRIP-MCNC: NEGATIVE MG/DL
HGB UR QL STRIP: ABNORMAL
KETONES UR STRIP-MCNC: 60 MG/DL
LEUKOCYTE ESTERASE UR QL STRIP: ABNORMAL
MUCOUS THREADS #/AREA URNS LPF: PRESENT /LPF
NITRATE UR QL: NEGATIVE
PH UR STRIP: 6 PH (ref 5–7)
RBC #/AREA URNS AUTO: 1 /HPF (ref 0–2)
SOURCE: ABNORMAL
SP GR UR STRIP: 1.02 (ref 1–1.03)
SQUAMOUS #/AREA URNS AUTO: 7 /HPF (ref 0–1)
UROBILINOGEN UR STRIP-MCNC: NORMAL MG/DL (ref 0–2)
WBC #/AREA URNS AUTO: 9 /HPF (ref 0–5)

## 2021-03-02 PROCEDURE — 59025 FETAL NON-STRESS TEST: CPT

## 2021-03-02 PROCEDURE — 87086 URINE CULTURE/COLONY COUNT: CPT | Performed by: OBSTETRICS & GYNECOLOGY

## 2021-03-02 PROCEDURE — 250N000011 HC RX IP 250 OP 636: Performed by: OBSTETRICS & GYNECOLOGY

## 2021-03-02 PROCEDURE — G0463 HOSPITAL OUTPT CLINIC VISIT: HCPCS | Mod: 25

## 2021-03-02 PROCEDURE — 96372 THER/PROPH/DIAG INJ SC/IM: CPT | Performed by: OBSTETRICS & GYNECOLOGY

## 2021-03-02 PROCEDURE — 87653 STREP B DNA AMP PROBE: CPT | Performed by: OBSTETRICS & GYNECOLOGY

## 2021-03-02 PROCEDURE — 99214 OFFICE O/P EST MOD 30 MIN: CPT | Performed by: OBSTETRICS & GYNECOLOGY

## 2021-03-02 PROCEDURE — 99207 PR PRENATAL VISIT: CPT | Performed by: OBSTETRICS & GYNECOLOGY

## 2021-03-02 PROCEDURE — 59025 FETAL NON-STRESS TEST: CPT | Mod: 59

## 2021-03-02 PROCEDURE — 81001 URINALYSIS AUTO W/SCOPE: CPT | Performed by: OBSTETRICS & GYNECOLOGY

## 2021-03-02 RX ORDER — BETAMETHASONE SODIUM PHOSPHATE AND BETAMETHASONE ACETATE 3; 3 MG/ML; MG/ML
12 INJECTION, SUSPENSION INTRA-ARTICULAR; INTRALESIONAL; INTRAMUSCULAR; SOFT TISSUE ONCE
Status: COMPLETED | OUTPATIENT
Start: 2021-03-02 | End: 2021-03-02

## 2021-03-02 RX ADMIN — BETAMETHASONE SODIUM PHOSPHATE AND BETAMETHASONE ACETATE 12 MG: 3; 3 INJECTION, SUSPENSION INTRA-ARTICULAR; INTRALESIONAL; INTRAMUSCULAR at 11:36

## 2021-03-02 ASSESSMENT — ACTIVITIES OF DAILY LIVING (ADL)
TOILETING_ISSUES: NO
FALL_HISTORY_WITHIN_LAST_SIX_MONTHS: NO

## 2021-03-02 NOTE — PROGRESS NOTES
OB Triage Note  Zi Mirza   3/2/2021  1:42 PM     S: Zi Mirza is a 30 year old old,  at 34w2d gestation with di-di twins who presents for abdominal pressure and cervical dilation of 3-4cm in clinic, concern for ruling out  labor.      PMH:   Past Medical History:   Diagnosis Date     No pertinent past medical history        PSH:  Past Surgical History:   Procedure Laterality Date     NO HISTORY OF SURGERY         Meds:  Medications Prior to Admission   Medication Sig Dispense Refill Last Dose     aspirin (ASA) 81 MG chewable tablet Take 81 mg by mouth daily   3/1/2021 at Unknown time     docusate sodium (COLACE) 100 MG capsule Take 100 mg by mouth 2 times daily as needed for constipation   Past Month at Unknown time     Ferrous Gluconate 324 (37.5 Fe) MG TABS Take by mouth 2 times daily   Past Month at Unknown time     Prenatal Vit-Fe Fumarate-FA (PRENATAL VITAMIN) 27-0.8 MG TABS    3/1/2021 at Unknown time     Misc. Devices (BREAST PUMP) MISC 1 each as needed (Patient not taking: Reported on 2021) 1 each 0         O:   Vitals:    21 1015   BP: 132/77   Resp: 16   Temp: 97.7  F (36.5  C)   TempSrc: Oral     General: lying in bed, comfortable, alert and cooperative, FOB at bedside  Abd: soft, gravid, nontender, nondistended, no CVA tenderness, +BS  Cervix: 3.5cm/50 (unchaged from clinic, after 2 hours of monitoring)    Labs/Imaging:   UA appeared contaminated, UCx pending    Assessment: Zi Mirza is a 30 year old old,  at 34w2d gestation with di-di twins who is not in  labor.    Plan:   Once she arrived to the floor she was noted to be having intermittent contractions which spaced out, and a repeat cervical exam was unchanged.  She did receive a dose of BMZ prior to discharge for risk of PTD due to advanced cervical dilation.  She has a follow-up US tomorrow and OB visit on Friday 3/5 with Dr Albright to again review delivery planning.    Alma Nicholas MD,  [facility]     Total time spent was 30 minutes; this includes pre-work time, intra-service time, and post-work time including time spent on documentation which occurred on the date of service.

## 2021-03-02 NOTE — PROVIDER NOTIFICATION
03/02/21 1330   Provider Notification   Provider Name/Title Dr. Marycarmen Nicholas   Method of Notification At Bedside   Request Evaluate in Person   Notification Reason Status Update   MD at bedside to discuss plan of care with her.  Orders received to discharge to home and then will come back tomorrow for her 2nd dose of Beta.

## 2021-03-02 NOTE — TELEPHONE ENCOUNTER
Pt calling this am.   C/o increase pelvic pressure and pain in her groin area.   States it is very hard to move. Was in tears on the phone. Pt is worried that her cervix may be dilating.     Babies are active.     Occasional contractions. Nothing regular.       Per Dr. Albright, pt can come in this am to be evaluated.     Pt advised.   Dasha ALEXANDER RN

## 2021-03-02 NOTE — PLAN OF CARE
Data: Patient assessed in the [facility] for uterine contractions, pelvic pain.  Cervical exam 3-4/50-60/-3 and posterior.  Membranes intact.  Contractions irregular and pt not feeling them.  Action:  Presumed adequate fetal oxygenation documented (see flow record). Discharge instructions reviewed.  Patient instructed to report change in fetal movement, vaginal leaking of fluid or bleeding, abdominal pain, or any concerns related to the pregnancy to her nurse/physician.    Response: Orders to discharge home per .  Patient verbalized understanding of education and verbalized agreement with plan. Discharged to home.

## 2021-03-02 NOTE — DISCHARGE INSTRUCTIONS
Discharge Instruction for Undelivered Patients      You were seen for: Labor Assessment  We Consulted: Dr. Marycarmen Nicholas  You had (Test or Medicine):fetal monitoring, GBS culture,,Betamethasone injection     Diet:   Drink 8 to 12 glasses of liquids (milk, juice, water) every day.  You may eat meals and snacks.     Activity:  Count fetal kicks everyday (see handout)  Call your doctor or nurse midwife if your baby is moving less than usual.     Call your provider if you notice:  Swelling in your face or increased swelling in your hands or legs.  Headaches that are not relieved by Tylenol (acetaminophen).  Changes in your vision (blurring: seeing spots or stars.)  Nausea (sick to your stomach) and vomiting (throwing up).   Weight gain of 5 pounds or more per week.  Heartburn that doesn't go away.  Signs of bladder infection: pain when you urinate (use the toilet), need to go more often and more urgently.  The bag of alexandre (rupture of membranes) breaks, or you notice leaking in your underwear.  Bright red blood in your underwear.  Abdominal (lower belly) or stomach pain.  Second (plus) baby: Contractions (tightening) less than 10 minutes apart and getting stronger.  Increase or change in vaginal discharge (note the color and amount)  Other: Come back to L&D on 4th floor for 2nd dose of Beta at 11:30 tomorrow and a urine sample if able.    Follow-up:  As scheduled in the clinic

## 2021-03-02 NOTE — PLAN OF CARE
Patient arrived via wheelchair from clinic for fetal monitoring and contraction monitoring. Admission assessment completed. Monitors applied. Patient denies pain. FHR baby A and B are reactive. Patient jaylon every 4-10 minutes not feeling them.      Order received for betamethasone, GBS swab, recheck cervix in 2 hours

## 2021-03-02 NOTE — NURSING NOTE
"Chief Complaint   Patient presents with     Prenatal Care       Initial /70   Wt 125.2 kg (276 lb)   LMP 2020 (Exact Date)   Breastfeeding No   BMI 40.76 kg/m   Estimated body mass index is 40.76 kg/m  as calculated from the following:    Height as of 20: 1.753 m (5' 9\").    Weight as of this encounter: 125.2 kg (276 lb).  BP completed using cuff size: regular    Questioned patient about current smoking habits.  Pt. has never smoked.        34w2d  + FM daily  + ginna kaur contractions  + increase pelvic pressure  + nl discharge  - bleeding    Lisa Cotton LPN               "

## 2021-03-02 NOTE — PROGRESS NOTES
Here for unscheduled visit for lower abdominal and pelvic pain since Saturday. No other symptoms. Good fetal movement.  On cervix exam, 3-4 cm so will send to L&D for monitoring for possible labor.  Discussed with on call MD and L&D.  Melany Albright MD

## 2021-03-03 ENCOUNTER — OFFICE VISIT (OUTPATIENT)
Dept: MATERNAL FETAL MEDICINE | Facility: CLINIC | Age: 31
End: 2021-03-03
Attending: OBSTETRICS & GYNECOLOGY
Payer: COMMERCIAL

## 2021-03-03 ENCOUNTER — HOSPITAL ENCOUNTER (OUTPATIENT)
Facility: CLINIC | Age: 31
End: 2021-03-03
Admitting: OBSTETRICS & GYNECOLOGY
Payer: COMMERCIAL

## 2021-03-03 ENCOUNTER — HOSPITAL ENCOUNTER (OUTPATIENT)
Dept: ULTRASOUND IMAGING | Facility: CLINIC | Age: 31
End: 2021-03-03
Attending: OBSTETRICS & GYNECOLOGY
Payer: COMMERCIAL

## 2021-03-03 ENCOUNTER — HOSPITAL ENCOUNTER (OUTPATIENT)
Facility: CLINIC | Age: 31
Discharge: HOME | End: 2021-03-03
Attending: OBSTETRICS & GYNECOLOGY | Admitting: OBSTETRICS & GYNECOLOGY
Payer: COMMERCIAL

## 2021-03-03 DIAGNOSIS — O40.3XX2 POLYHYDRAMNIOS IN THIRD TRIMESTER COMPLICATION, FETUS 2 OF MULTIPLE GESTATION: Primary | ICD-10-CM

## 2021-03-03 DIAGNOSIS — O30.049 DICHORIONIC DIAMNIOTIC TWIN PREGNANCY, ANTEPARTUM: ICD-10-CM

## 2021-03-03 DIAGNOSIS — O40.3XX2 POLYHYDRAMNIOS IN THIRD TRIMESTER COMPLICATION, FETUS 2 OF MULTIPLE GESTATION: ICD-10-CM

## 2021-03-03 LAB
BACTERIA SPEC CULT: NORMAL
GP B STREP DNA SPEC QL NAA+PROBE: NEGATIVE
Lab: NORMAL
SPECIMEN SOURCE: NORMAL
SPECIMEN SOURCE: NORMAL

## 2021-03-03 PROCEDURE — 76815 OB US LIMITED FETUS(S): CPT | Mod: 26 | Performed by: OBSTETRICS & GYNECOLOGY

## 2021-03-03 PROCEDURE — 76815 OB US LIMITED FETUS(S): CPT

## 2021-03-03 PROCEDURE — 250N000011 HC RX IP 250 OP 636: Performed by: OBSTETRICS & GYNECOLOGY

## 2021-03-03 PROCEDURE — 96372 THER/PROPH/DIAG INJ SC/IM: CPT | Performed by: OBSTETRICS & GYNECOLOGY

## 2021-03-03 RX ORDER — BETAMETHASONE SODIUM PHOSPHATE AND BETAMETHASONE ACETATE 3; 3 MG/ML; MG/ML
12 INJECTION, SUSPENSION INTRA-ARTICULAR; INTRALESIONAL; INTRAMUSCULAR; SOFT TISSUE ONCE
Status: COMPLETED | OUTPATIENT
Start: 2021-03-03 | End: 2021-03-03

## 2021-03-03 RX ADMIN — BETAMETHASONE SODIUM PHOSPHATE AND BETAMETHASONE ACETATE 12 MG: 3; 3 INJECTION, SUSPENSION INTRA-ARTICULAR; INTRALESIONAL; INTRAMUSCULAR at 11:53

## 2021-03-03 NOTE — NURSING NOTE
Patient here for U/S at [facility]. Patient was in L/D this am for second dose of betamethasone and states a doctor there told her she would have her cervix checked following ultrasound. Reviewed with Dr. LATRICIA Segura, will defer to primary OB to determine their plan for patient. Spoke with EFRAIN Friend, who reviewed with Dr. CATHERINE Correa who advised that patient does not need cervical check. Patient was notified and discharged from clinic.

## 2021-03-03 NOTE — PLAN OF CARE
Pt  Here for second shot of betamethasone and reports continued pelvic pressure since last Saturday, and woke up with headache this morning not helped with rest and hydration and swelling in feet. Normal reflexes and no clonus. Dr Correa notified and coming to see pt.  1210 Dr Correa at bedside to speak with pt about POC and address today's concerns, no further orders from MD. Pt states she will go to today's 1500 MFM appointment and will call clinic if further concerns present.

## 2021-03-03 NOTE — PROGRESS NOTES
The patient was seen for an ultrasound in the Maternal-Fetal Medicine Center at the [facility] today.  For a detailed report of the ultrasound examination, please see the ultrasound report which can be found under the imaging tab.    Tammie Segura MD  , OB/GYN  Maternal-Fetal Medicine  [phone] (Pager)

## 2021-03-04 ENCOUNTER — PRENATAL OFFICE VISIT (OUTPATIENT)
Dept: OBGYN | Facility: CLINIC | Age: 31
End: 2021-03-04
Payer: COMMERCIAL

## 2021-03-04 VITALS — WEIGHT: 276 LBS | BODY MASS INDEX: 40.76 KG/M2 | DIASTOLIC BLOOD PRESSURE: 84 MMHG | SYSTOLIC BLOOD PRESSURE: 138 MMHG

## 2021-03-04 DIAGNOSIS — O30.049 DICHORIONIC DIAMNIOTIC TWIN PREGNANCY, ANTEPARTUM: Primary | ICD-10-CM

## 2021-03-04 PROCEDURE — 99207 PR PRENATAL VISIT: CPT | Performed by: OBSTETRICS & GYNECOLOGY

## 2021-03-04 NOTE — PROGRESS NOTES
Doing well.   Reports more pelvic pressure and lower abdominal cramping as well as mid back pulsatile discomfort.   Otherwise, denies VB, ctx, LOF. +FM  /84   Wt 125.2 kg (276 lb)   LMP 2020 (Exact Date)   BMI 40.76 kg/m    General Appearance: NAD  Abdomen: Gravid, NT  Refer to flow sheet above.   A/P: 30 year old  at 34w4d  -- di-di twin gestation: most recent  ultrasound surveillance shows cephalic x 2 presentation   -- reviewed GBS negative status  -- PTL precautions reviewed  RTC in 1 week     Lazaro Galeana MD  [facility]

## 2021-03-04 NOTE — NURSING NOTE
"Chief Complaint   Patient presents with     Prenatal Care     34 4/7 weeks       Initial /84   Wt 125.2 kg (276 lb)   LMP 2020 (Exact Date)   BMI 40.76 kg/m   Estimated body mass index is 40.76 kg/m  as calculated from the following:    Height as of 20: 1.753 m (5' 9\").    Weight as of this encounter: 125.2 kg (276 lb).  BP completed using cuff size: large    Questioned patient about current smoking habits.  Pt. has never smoked.          The following HM Due: NONE    +fetal movement  -swelling    Marilyn Contreras, CMA    "

## 2021-03-11 ENCOUNTER — PRENATAL OFFICE VISIT (OUTPATIENT)
Dept: OBGYN | Facility: CLINIC | Age: 31
End: 2021-03-11
Payer: COMMERCIAL

## 2021-03-11 VITALS — BODY MASS INDEX: 40.86 KG/M2 | DIASTOLIC BLOOD PRESSURE: 64 MMHG | SYSTOLIC BLOOD PRESSURE: 120 MMHG | WEIGHT: 276.7 LBS

## 2021-03-11 DIAGNOSIS — O30.049 DICHORIONIC DIAMNIOTIC TWIN PREGNANCY, ANTEPARTUM: Primary | ICD-10-CM

## 2021-03-11 PROCEDURE — 99207 PR PRENATAL VISIT: CPT | Performed by: OBSTETRICS & GYNECOLOGY

## 2021-03-11 NOTE — PROGRESS NOTES
29 yo  at 35w4d with Di/Di twins added on urgently to schedule due to pelvic pain.  Reports cramps but no strong ctx.  Cramps last roughly 20 secs.  Babies active.  Anxious for delivery and requesting induction. Explained premature GA and advised against induction for maternal discomfort alone.  Cervix essentially unchanged from previous assessment although very favorable.    RTC tomorrow with Dr. Albright as scheduled

## 2021-03-17 ENCOUNTER — HOSPITAL ENCOUNTER (OUTPATIENT)
Dept: ULTRASOUND IMAGING | Facility: CLINIC | Age: 31
End: 2021-03-17
Attending: OBSTETRICS & GYNECOLOGY
Payer: COMMERCIAL

## 2021-03-17 ENCOUNTER — OFFICE VISIT (OUTPATIENT)
Dept: MATERNAL FETAL MEDICINE | Facility: CLINIC | Age: 31
End: 2021-03-17
Attending: OBSTETRICS & GYNECOLOGY
Payer: COMMERCIAL

## 2021-03-17 DIAGNOSIS — O30.049 DICHORIONIC DIAMNIOTIC TWIN PREGNANCY, ANTEPARTUM: ICD-10-CM

## 2021-03-17 DIAGNOSIS — O30.049 DICHORIONIC DIAMNIOTIC TWIN PREGNANCY, ANTEPARTUM: Primary | ICD-10-CM

## 2021-03-17 PROCEDURE — 76816 OB US FOLLOW-UP PER FETUS: CPT | Mod: 26 | Performed by: OBSTETRICS & GYNECOLOGY

## 2021-03-17 PROCEDURE — 76816 OB US FOLLOW-UP PER FETUS: CPT | Mod: 59

## 2021-03-17 NOTE — PROGRESS NOTES
The patient was seen for an ultrasound in the Maternal-Fetal Medicine Center at the [facility] today.  For a detailed report of the ultrasound examination, please see the ultrasound report which can be found under the imaging tab.    Tammie Segura MD  , OB/GYN  Maternal-Fetal Medicine  [phone] (Pager)

## 2021-03-19 ENCOUNTER — HOSPITAL ENCOUNTER (INPATIENT)
Facility: CLINIC | Age: 31
LOS: 3 days | Discharge: HOME | End: 2021-03-22
Attending: OBSTETRICS & GYNECOLOGY | Admitting: OBSTETRICS & GYNECOLOGY
Payer: COMMERCIAL

## 2021-03-19 ENCOUNTER — PRENATAL OFFICE VISIT (OUTPATIENT)
Dept: OBGYN | Facility: CLINIC | Age: 31
End: 2021-03-19
Payer: COMMERCIAL

## 2021-03-19 VITALS — WEIGHT: 278 LBS | DIASTOLIC BLOOD PRESSURE: 68 MMHG | SYSTOLIC BLOOD PRESSURE: 120 MMHG | BODY MASS INDEX: 41.05 KG/M2

## 2021-03-19 DIAGNOSIS — O30.049 DICHORIONIC DIAMNIOTIC TWIN PREGNANCY, ANTEPARTUM: Primary | ICD-10-CM

## 2021-03-19 LAB
ABO + RH BLD: NORMAL
ABO + RH BLD: NORMAL
BLD GP AB SCN SERPL QL: NORMAL
BLOOD BANK CMNT PATIENT-IMP: NORMAL
HGB BLD-MCNC: 11.1 G/DL (ref 11.7–15.7)
LABORATORY COMMENT REPORT: NORMAL
SARS-COV-2 RNA RESP QL NAA+PROBE: NEGATIVE
SPECIMEN EXP DATE BLD: NORMAL
SPECIMEN SOURCE: NORMAL

## 2021-03-19 PROCEDURE — 99221 1ST HOSP IP/OBS SF/LOW 40: CPT | Performed by: OBSTETRICS & GYNECOLOGY

## 2021-03-19 PROCEDURE — 86850 RBC ANTIBODY SCREEN: CPT | Performed by: OBSTETRICS & GYNECOLOGY

## 2021-03-19 PROCEDURE — 36415 COLL VENOUS BLD VENIPUNCTURE: CPT | Performed by: OBSTETRICS & GYNECOLOGY

## 2021-03-19 PROCEDURE — 120N000001 HC R&B MED SURG/OB

## 2021-03-19 PROCEDURE — 86900 BLOOD TYPING SEROLOGIC ABO: CPT | Performed by: OBSTETRICS & GYNECOLOGY

## 2021-03-19 PROCEDURE — 85018 HEMOGLOBIN: CPT | Performed by: OBSTETRICS & GYNECOLOGY

## 2021-03-19 PROCEDURE — 86901 BLOOD TYPING SEROLOGIC RH(D): CPT | Performed by: OBSTETRICS & GYNECOLOGY

## 2021-03-19 PROCEDURE — 86780 TREPONEMA PALLIDUM: CPT | Performed by: OBSTETRICS & GYNECOLOGY

## 2021-03-19 PROCEDURE — 87635 SARS-COV-2 COVID-19 AMP PRB: CPT | Performed by: OBSTETRICS & GYNECOLOGY

## 2021-03-19 PROCEDURE — 99207 PR PRENATAL VISIT: CPT | Performed by: OBSTETRICS & GYNECOLOGY

## 2021-03-19 PROCEDURE — 258N000003 HC RX IP 258 OP 636: Performed by: OBSTETRICS & GYNECOLOGY

## 2021-03-19 PROCEDURE — 250N000011 HC RX IP 250 OP 636: Performed by: OBSTETRICS & GYNECOLOGY

## 2021-03-19 PROCEDURE — G0463 HOSPITAL OUTPT CLINIC VISIT: HCPCS

## 2021-03-19 PROCEDURE — 59426 ANTEPARTUM CARE ONLY: CPT | Performed by: OBSTETRICS & GYNECOLOGY

## 2021-03-19 RX ORDER — NALOXONE HYDROCHLORIDE 0.4 MG/ML
0.4 INJECTION, SOLUTION INTRAMUSCULAR; INTRAVENOUS; SUBCUTANEOUS
Status: DISCONTINUED | OUTPATIENT
Start: 2021-03-19 | End: 2021-03-21

## 2021-03-19 RX ORDER — TRANEXAMIC ACID 10 MG/ML
1 INJECTION, SOLUTION INTRAVENOUS EVERY 30 MIN PRN
Status: DISCONTINUED | OUTPATIENT
Start: 2021-03-19 | End: 2021-03-21

## 2021-03-19 RX ORDER — DIPHENHYDRAMINE HYDROCHLORIDE 50 MG/ML
50 INJECTION INTRAMUSCULAR; INTRAVENOUS
Status: DISCONTINUED | OUTPATIENT
Start: 2021-03-19 | End: 2021-03-21

## 2021-03-19 RX ORDER — HYDROXYZINE HYDROCHLORIDE 50 MG/1
50-100 TABLET, FILM COATED ORAL
Status: DISCONTINUED | OUTPATIENT
Start: 2021-03-19 | End: 2021-03-21

## 2021-03-19 RX ORDER — FENTANYL CITRATE 50 UG/ML
50-100 INJECTION, SOLUTION INTRAMUSCULAR; INTRAVENOUS
Status: DISCONTINUED | OUTPATIENT
Start: 2021-03-19 | End: 2021-03-21

## 2021-03-19 RX ORDER — ONDANSETRON 2 MG/ML
4 INJECTION INTRAMUSCULAR; INTRAVENOUS EVERY 6 HOURS PRN
Status: DISCONTINUED | OUTPATIENT
Start: 2021-03-19 | End: 2021-03-21

## 2021-03-19 RX ORDER — NALOXONE HYDROCHLORIDE 0.4 MG/ML
0.2 INJECTION, SOLUTION INTRAMUSCULAR; INTRAVENOUS; SUBCUTANEOUS
Status: DISCONTINUED | OUTPATIENT
Start: 2021-03-19 | End: 2021-03-21

## 2021-03-19 RX ORDER — METHYLERGONOVINE MALEATE 0.2 MG/ML
200 INJECTION INTRAVENOUS
Status: COMPLETED | OUTPATIENT
Start: 2021-03-19 | End: 2021-03-21

## 2021-03-19 RX ORDER — SODIUM CHLORIDE, SODIUM LACTATE, POTASSIUM CHLORIDE, CALCIUM CHLORIDE 600; 310; 30; 20 MG/100ML; MG/100ML; MG/100ML; MG/100ML
INJECTION, SOLUTION INTRAVENOUS CONTINUOUS
Status: DISCONTINUED | OUTPATIENT
Start: 2021-03-19 | End: 2021-03-21

## 2021-03-19 RX ORDER — OXYCODONE AND ACETAMINOPHEN 5; 325 MG/1; MG/1
1 TABLET ORAL
Status: DISCONTINUED | OUTPATIENT
Start: 2021-03-19 | End: 2021-03-21

## 2021-03-19 RX ORDER — OXYTOCIN/0.9 % SODIUM CHLORIDE 30/500 ML
100-340 PLASTIC BAG, INJECTION (ML) INTRAVENOUS CONTINUOUS PRN
Status: DISCONTINUED | OUTPATIENT
Start: 2021-03-19 | End: 2021-03-21

## 2021-03-19 RX ORDER — ACETAMINOPHEN 325 MG/1
650 TABLET ORAL EVERY 4 HOURS PRN
Status: DISCONTINUED | OUTPATIENT
Start: 2021-03-19 | End: 2021-03-21

## 2021-03-19 RX ORDER — METHYLPREDNISOLONE SODIUM SUCCINATE 125 MG/2ML
125 INJECTION, POWDER, LYOPHILIZED, FOR SOLUTION INTRAMUSCULAR; INTRAVENOUS
Status: DISCONTINUED | OUTPATIENT
Start: 2021-03-19 | End: 2021-03-21

## 2021-03-19 RX ORDER — DOCUSATE SODIUM 100 MG/1
100 CAPSULE, LIQUID FILLED ORAL 2 TIMES DAILY PRN
Status: DISCONTINUED | OUTPATIENT
Start: 2021-03-19 | End: 2021-03-21

## 2021-03-19 RX ORDER — CARBOPROST TROMETHAMINE 250 UG/ML
250 INJECTION, SOLUTION INTRAMUSCULAR
Status: DISCONTINUED | OUTPATIENT
Start: 2021-03-19 | End: 2021-03-21

## 2021-03-19 RX ORDER — IBUPROFEN 800 MG/1
800 TABLET, FILM COATED ORAL
Status: COMPLETED | OUTPATIENT
Start: 2021-03-19 | End: 2021-03-21

## 2021-03-19 RX ORDER — OXYTOCIN 10 [USP'U]/ML
10 INJECTION, SOLUTION INTRAMUSCULAR; INTRAVENOUS
Status: DISCONTINUED | OUTPATIENT
Start: 2021-03-19 | End: 2021-03-21

## 2021-03-19 RX ADMIN — IRON SUCROSE 300 MG: 20 INJECTION, SOLUTION INTRAVENOUS at 17:30

## 2021-03-19 NOTE — PROVIDER NOTIFICATION
03/19/21 1046   Provider Notification   Provider Name/Title Dr Justina Myers   Method of Notification Phone   Request Evaluate - Remote   Notification Reason Patient Arrived     MD updated on pt arrival. MD states she will come to floor shortly to scan pt to ensure babies are both vertex. Plan to continue to monitor

## 2021-03-19 NOTE — NURSING NOTE
"Chief Complaint   Patient presents with     Prenatal Care       Initial /68   Wt 126.1 kg (278 lb)   LMP 2020 (Exact Date)   Breastfeeding No   BMI 41.05 kg/m   Estimated body mass index is 41.05 kg/m  as calculated from the following:    Height as of 20: 1.753 m (5' 9\").    Weight as of this encounter: 126.1 kg (278 lb).  BP completed using cuff size: regular    Questioned patient about current smoking habits.  Pt. has never smoked.          36w5d  + FM daily  + mild cramping  - bleeding or leaking of fluid  Lisa Cotton LPN               "

## 2021-03-19 NOTE — H&P
L&D History and Physical   2021  Zi Mirza  [id number]      HPI: Zi Mirza is a 30 year old  at 36w5d by LMP c/w early outsideUS, here for observation due to advanced  cervical dilation in the setting of di/di twin pregnancy. Her cervical dilation in clinic today was 5.5cm, she has been stable on the unit at 6cm over several hours. She reported mild cramping in clinic today but is feeling asymptomatic now.     She states that she is feeling well today.  She denies fever, HA, blurred vision, Nausea, vomiting, CP, SOB, abdominal pain, constipation, diarrhea, vaginal bleeding, LOF, abnormal vaginal discharge, and acute swelling.  +FM.      Complications of Pregnancy:  - di di twins  - advanced cervical dilation  - LGA w/mild poly in baby B, now resolved  - Anemia: hgb 10 on admission  - Morbid obesity    OBHX:   OB History    Para Term  AB Living   2 1 1 0 0 1   SAB TAB Ectopic Multiple Live Births   0 0 0 0 1      # Outcome Date GA Lbr Luis Antonio/2nd Weight Sex Delivery Anes PTL Lv   2 Current            1 Term 10/04/15 37w0d  3.629 kg (8 lb) M  EPI N BRYANNA      Name: Elfego RosaHX:   Past Medical History:   Diagnosis Date     No pertinent past medical history        SurgicalHX:   Past Surgical History:   Procedure Laterality Date     NO HISTORY OF SURGERY         Medications:   No current facility-administered medications on file prior to encounter.   aspirin (ASA) 81 MG chewable tablet, Take 81 mg by mouth daily  Prenatal Vit-Fe Fumarate-FA (PRENATAL VITAMIN) 27-0.8 MG TABS,   Misc. Devices (BREAST PUMP) MISC, 1 each as needed (Patient not taking: Reported on 2021)        Allergies:  No Known Allergies    FamilyHX:  Family History   Problem Relation Age of Onset     Hypertension Mother      Asthma Father      No Known Problems Sister      No Known Problems Brother      Diabetes Type 1 Maternal Grandmother      Bone Cancer Maternal Grandfather       Cerebrovascular Disease Paternal Grandmother      No Known Problems Sister      No Known Problems Sister      Hypertension Sister      No Known Problems Brother        SocialHX:   Social History     Socioeconomic History     Marital status:      Spouse name: Eriberto     Number of children: 1     Years of education: None     Highest education level: None   Occupational History     Occupation: self employed   Social Needs     Financial resource strain: None     Food insecurity     Worry: None     Inability: None     Transportation needs     Medical: None     Non-medical: None   Tobacco Use     Smoking status: Never Smoker     Smokeless tobacco: Never Used   Substance and Sexual Activity     Alcohol use: No     Comment: rare     Drug use: No     Sexual activity: Yes     Partners: Male   Lifestyle     Physical activity     Days per week: None     Minutes per session: None     Stress: None   Relationships     Social connections     Talks on phone: None     Gets together: None     Attends Baptism service: None     Active member of club or organization: None     Attends meetings of clubs or organizations: None     Relationship status: None     Intimate partner violence     Fear of current or ex partner: None     Emotionally abused: None     Physically abused: None     Forced sexual activity: None   Other Topics Concern     None   Social History Narrative     None       ROS: 10-point ROS negative except as in HPI    Physical Exam:  Vitals:    03/19/21 1010   BP: 129/66   Resp: 16   Temp: 98.1  F (36.7  C)   TempSrc: Oral     GEN: resting comfortably in bed, in NAD   CVS: RRR, no murmur appreciated   PULMONARY: CTAB, no increased work of breathing, no cough/wheeze   ABDOMEN: soft, gravid, non-tender, non-distended  EXTREMITIES: scant edema, non tender to palpation  CVX: 6/80/-2  Presentation: vtx/vtx by BSUS  EFW: 7 lbs 1 oz and 8 lbs, 11.7% discordance on MFM US 3/17    NST:  FHT: baseline 145, mod variability,  accelerations +, no decelerations          baseline 145, mod variability, accelerations +, no decelerations  TOCO: irritability, mild contractions ever 4-6min    Labs:   Results for orders placed or performed during the hospital encounter of 21 (from the past 24 hour(s))   Asymptomatic SARS-CoV-2 COVID-19 Virus (Coronavirus) by PCR    Specimen: Nasopharyngeal   Result Value Ref Range    SARS-CoV-2 Virus Specimen Source Nasopharyngeal     SARS-CoV-2 PCR Result NEGATIVE     SARS-CoV-2 PCR Comment (Note)          Lab Results   Component Value Date    ABO O 10/06/2020    RH Pos 10/06/2020    AS Negative 10/06/2020    HEPBANG Non-reactive 10/06/2020    CHPCRT Negative 10/06/2020    GCPCRT Negative 10/06/2020    RUBELLAABIGG Immune 10/06/2020    HGB 10.0 (L) 2021       GBS Status:   Lab Results   Component Value Date    GBS Negative 2021       No results found for: PAP    A/P: Zi Mirza is a 30 year old female  at 36w5d by early US, here for observation 2/2 advanced cervical dilation in the late  period with di/di twins, both vertex.     Admit for: Observation for advanced  cervical dilation in the setting of di/di twins  FHT: Category I, reactive: plan Q shift NSTs while under observtion  MWB: regular diet, amulation in room encouraged, SCDs while in bed for VTE ppx. Following delivery plan lovenox while in house  Delivery plan: desires . Reviewed at length indications for  delivery including fetal intolerance and spontaneous version of either twin. Will plan IOL at 37w0d if still pregnant at that time. This plan was discussed by my partner with VINAYAK, who agrees with IOL given advanced cervical dilation.     Justina Myers MD  OB/GYN

## 2021-03-19 NOTE — PROGRESS NOTES
PROBLEM LIST  LABS: Opos/RI    1. Di-di twins (girls--no one knows but patient and ): both babies >90%tile, 7lb 1 oz (A) and 8 lb (B) on 3/16  High one hr glucose tolerance test, 3 hr within normal limits.  Poly twin B--resolved, see MFM notes. At this point, if both remain cephalic, would plan vaginal delivery, but would not recommend attempt at breech extraction of the second twin due to size of both and size of B relative to A.  Group B Strep negative.    Today, uncomfortable. On exam, cervix is 5-6/70/-1 station, first twin cephalic. Second twin has been cephalic on repeat US, but I did not check again today. Will recheck position of twin B if she stays for admission to L&D. I recommend evaluation on L&D, signed out to on call. Patient and her  are in agreement with the plan.    Melany Albright MD

## 2021-03-19 NOTE — PROVIDER NOTIFICATION
03/19/21 1345   Provider Notification   Provider Name/Title Dr Myers   Method of Notification At Bedside   Request Evaluate in Person     MD at bedside. Discussed plan to have pt stay in hospital until delivery date (possible induction on Sunday at 37.0 weeks) unless labor progresses sooner. MD scanned pt with bedside ultrasound to confirm vertex, vertex presentation. Plan to recheck pt around 1445 and call MD for further orders at that time.

## 2021-03-19 NOTE — PLAN OF CARE
Data: [facility] presented to [facility]: 3/19/2021  9:57 AM.  Reason for maternal/fetal assessment is rule out labor. Patient reports that she is coming over from appointment. She was 6cm at OB visit today but she is not feeling contractions. Dr Dixon called pt in to be monitored for contractions and SVE.  Patient is a .  Prenatal record reviewed. Pregnancy has been twins..  Gestational Age 36w5d. VSS. Fetal movement present. Patient denies leaking of vaginal fluid/rupture of membranes, vaginal bleeding, abdominal pain, nausea, vomiting, headache, visual disturbances, epigastric or URQ pain, significant edema. Support person is present.   Action: Verbal consent for EFM. Triage assessment completed. Bill of rights reviewed.  Response: Patient verbalized agreement with plan. Will contact Dr Justina Myers with update and further orders.

## 2021-03-20 ENCOUNTER — ANESTHESIA (OUTPATIENT)
Dept: OBGYN | Facility: CLINIC | Age: 31
End: 2021-03-20
Payer: COMMERCIAL

## 2021-03-20 ENCOUNTER — ANESTHESIA EVENT (OUTPATIENT)
Dept: OBGYN | Facility: CLINIC | Age: 31
End: 2021-03-20
Payer: COMMERCIAL

## 2021-03-20 LAB — T PALLIDUM AB SER QL: NONREACTIVE

## 2021-03-20 PROCEDURE — 999N000011 HC STATISTIC ANESTHESIA CASE

## 2021-03-20 PROCEDURE — 250N000013 HC RX MED GY IP 250 OP 250 PS 637: Performed by: OBSTETRICS & GYNECOLOGY

## 2021-03-20 PROCEDURE — 120N000001 HC R&B MED SURG/OB

## 2021-03-20 PROCEDURE — 370N000003 HC ANESTHESIA WARD SERVICE

## 2021-03-20 RX ORDER — FENTANYL CITRATE-0.9 % NACL/PF 10 MCG/ML
100 PLASTIC BAG, INJECTION (ML) INTRAVENOUS EVERY 5 MIN PRN
Status: DISCONTINUED | OUTPATIENT
Start: 2021-03-20 | End: 2021-03-21

## 2021-03-20 RX ORDER — NALBUPHINE HYDROCHLORIDE 10 MG/ML
2.5-5 INJECTION, SOLUTION INTRAMUSCULAR; INTRAVENOUS; SUBCUTANEOUS EVERY 6 HOURS PRN
Status: DISCONTINUED | OUTPATIENT
Start: 2021-03-20 | End: 2021-03-21

## 2021-03-20 RX ORDER — LIDOCAINE HYDROCHLORIDE AND EPINEPHRINE 15; 5 MG/ML; UG/ML
3 INJECTION, SOLUTION EPIDURAL
Status: DISCONTINUED | OUTPATIENT
Start: 2021-03-20 | End: 2021-03-21

## 2021-03-20 RX ORDER — ONDANSETRON 4 MG/1
4 TABLET, ORALLY DISINTEGRATING ORAL EVERY 6 HOURS PRN
Status: DISCONTINUED | OUTPATIENT
Start: 2021-03-20 | End: 2021-03-21

## 2021-03-20 RX ORDER — CALCIUM CARBONATE 500 MG/1
1000 TABLET, CHEWABLE ORAL EVERY 4 HOURS PRN
Status: DISCONTINUED | OUTPATIENT
Start: 2021-03-20 | End: 2021-03-21

## 2021-03-20 RX ORDER — ONDANSETRON 2 MG/ML
4 INJECTION INTRAMUSCULAR; INTRAVENOUS EVERY 6 HOURS PRN
Status: DISCONTINUED | OUTPATIENT
Start: 2021-03-20 | End: 2021-03-21

## 2021-03-20 RX ADMIN — DOCUSATE SODIUM 100 MG: 100 CAPSULE, LIQUID FILLED ORAL at 19:26

## 2021-03-20 RX ADMIN — ACETAMINOPHEN 650 MG: 325 TABLET, FILM COATED ORAL at 00:01

## 2021-03-20 RX ADMIN — HYDROXYZINE HYDROCHLORIDE 100 MG: 50 TABLET, FILM COATED ORAL at 23:33

## 2021-03-20 RX ADMIN — DOCUSATE SODIUM 100 MG: 100 CAPSULE, LIQUID FILLED ORAL at 08:43

## 2021-03-20 RX ADMIN — HYDROXYZINE HYDROCHLORIDE 100 MG: 50 TABLET, FILM COATED ORAL at 00:02

## 2021-03-20 ASSESSMENT — ACTIVITIES OF DAILY LIVING (ADL)
TOILETING_ISSUES: NO
FALL_HISTORY_WITHIN_LAST_SIX_MONTHS: NO

## 2021-03-20 NOTE — PROVIDER NOTIFICATION
03/20/21 0121   Provider Notification   Provider Name/Title Dr. Myers    Method of Notification Electronic Page   Request Evaluate - Remote   Notification Reason Labor Status     Physician informed of regular contractions. Palpating moderate. Pt not feeling until noted by nurse. Currently sleeping. No significant change in SVE. Questionable bulging bag. Both babies have reactive NST. See flowsheet for details. Korin Mckeon RN on 3/20/2021 at 1:25 AM

## 2021-03-20 NOTE — PROGRESS NOTES
"[facility] Antepartum Progress Note    Zi Mirza MRN# [id number]   Age: 30 year old  Gestational age: 36w6d YOB: 1990       Date of Admission: 3/19/2021          Subjective:         Pt resting, no UCs felt, no VB or SROM.  Fetuses active.          Objective:          Patient Vitals for the past 12 hrs:   BP Temp Temp src Resp SpO2 Height   21 0829 125/72 98.1  F (36.7  C) Oral 16 97 % 1.753 m (5' 9\")   21 2349 132/64 98.2  F (36.8  C) -- 12 -- --     Temp:  [98.1  F (36.7  C)-98.2  F (36.8  C)] 98.1  F (36.7  C)  Resp:  [12-16] 16  BP: (116-135)/(58-75) 125/72  SpO2:  [97 %] 97 %      Gen: Well-appearing, NAD  Abdomen: Gravid, Soft, Non-tender   LE: 1+ edema, nontender.     LABS (Last ten results)     Results for orders placed or performed during the hospital encounter of 21   Asymptomatic SARS-CoV-2 COVID-19 Virus (Coronavirus) by PCR     Status: None    Specimen: Nasopharyngeal   Result Value Ref Range    SARS-CoV-2 Virus Specimen Source Nasopharyngeal     SARS-CoV-2 PCR Result NEGATIVE     SARS-CoV-2 PCR Comment (Note)    Treponema Abs w Reflex to RPR and Titer     Status: None   Result Value Ref Range    Treponema Antibodies Nonreactive NR^Nonreactive   Hemoglobin     Status: Abnormal   Result Value Ref Range    Hemoglobin 11.1 (L) 11.7 - 15.7 g/dL   ABO/Rh type and screen     Status: None   Result Value Ref Range    ABO O     RH(D) Pos     Antibody Screen Neg     Test Valid Only At [facility]        Specimen Expires 2021                Assessment/Plan:          30 year old y.o.  at Dichorionic/Diamniotic Twins at 36w6d admitted for advanced Cx dilation (6 cm) yesterday in clinic.         1)  Continue observation in L&D in case she SROMs or spontaneously labors  2)  Plan for induction tomorrow w/ Dr. Correa (he is aware).       Madhav Breen MD          "

## 2021-03-20 NOTE — PROVIDER NOTIFICATION
03/20/21 0919   Provider Notification   Provider Name/Title Dr. Breen   Method of Notification In Department   Request Evaluate - Remote   Notification Reason Status Update   Dr. Breen updated that patient today is feeling very well, denies pain, is having contractions every 7-9 minutes without feeling them. Patient has showered, AM monitoring is completed and both NSTs are reactive, VSS. Patient wondering about taking her baby ASA. Orders to not give ASA, patient is not on bedrest, instruct her to continue getting up to the bathroom/up in room. Also confirmed orders to continue Q shift NST still appropriate for monitoring and Dr. Breen confirms this. Patient and spouse verbalize agreement to plan of care.     Patient and nurse had long conversation regarding her increased risk for PPH due to her previous PPH and multiple delivery. Discussed medications available, patient gives verbal consent for blood products if needed after delivery, and patient verbalized understanding of medications/processes to reduce blood loss.     Patient and nurse discuss options for epidural infusion at the start or immediately prior to induction. Patient states she would really like to have the epidural before starting induction and was willing to get dry epidural placed to ensure it would be available in the event of a quick delivery.

## 2021-03-20 NOTE — PLAN OF CARE
No change with patient condition, small amount of spotting noted after patient used the rest room. No significant contractions noted. TORB for PRN atarax and colace. Will continue to monitor and update as needed.

## 2021-03-20 NOTE — PLAN OF CARE
OK for intermittent monitoring q shift. MD to place order for iron infusion. OK for patient to be up in room. Will continue to monitor and update as needed.

## 2021-03-20 NOTE — PROGRESS NOTES
Patient feeling well this morning, planning to shower, brush teeth, order breakfast and do morning monitoring. Plan of care established with patient to do vitals/assessment/monitoring at 0815 after she has finished her activities of daily living. Patient denies pain, leaking, and reports active movement with each baby.  Jaymie Yang RN on 3/20/2021 at 7:53 AM

## 2021-03-21 PROCEDURE — 250N000013 HC RX MED GY IP 250 OP 250 PS 637: Performed by: OBSTETRICS & GYNECOLOGY

## 2021-03-21 PROCEDURE — 3E033VJ INTRODUCTION OF OTHER HORMONE INTO PERIPHERAL VEIN, PERCUTANEOUS APPROACH: ICD-10-PCS | Performed by: OBSTETRICS & GYNECOLOGY

## 2021-03-21 PROCEDURE — 88307 TISSUE EXAM BY PATHOLOGIST: CPT | Mod: 26

## 2021-03-21 PROCEDURE — 88307 TISSUE EXAM BY PATHOLOGIST: CPT | Mod: TC | Performed by: OBSTETRICS & GYNECOLOGY

## 2021-03-21 PROCEDURE — 120N000001 HC R&B MED SURG/OB

## 2021-03-21 PROCEDURE — 250N000009 HC RX 250: Performed by: OBSTETRICS & GYNECOLOGY

## 2021-03-21 PROCEDURE — 258N000003 HC RX IP 258 OP 636: Performed by: OBSTETRICS & GYNECOLOGY

## 2021-03-21 PROCEDURE — 59410 OBSTETRICAL CARE: CPT | Performed by: OBSTETRICS & GYNECOLOGY

## 2021-03-21 PROCEDURE — 10907ZC DRAINAGE OF AMNIOTIC FLUID, THERAPEUTIC FROM PRODUCTS OF CONCEPTION, VIA NATURAL OR ARTIFICIAL OPENING: ICD-10-PCS | Performed by: OBSTETRICS & GYNECOLOGY

## 2021-03-21 PROCEDURE — 722N000002 HC LABOR CARE VAGINAL DELIVERY MULT

## 2021-03-21 PROCEDURE — 3E0R3BZ INTRODUCTION OF ANESTHETIC AGENT INTO SPINAL CANAL, PERCUTANEOUS APPROACH: ICD-10-PCS | Performed by: OBSTETRICS & GYNECOLOGY

## 2021-03-21 PROCEDURE — 59409 OBSTETRICAL CARE: CPT | Mod: 59 | Performed by: OBSTETRICS & GYNECOLOGY

## 2021-03-21 PROCEDURE — 00HU33Z INSERTION OF INFUSION DEVICE INTO SPINAL CANAL, PERCUTANEOUS APPROACH: ICD-10-PCS | Performed by: OBSTETRICS & GYNECOLOGY

## 2021-03-21 PROCEDURE — 0UQMXZZ REPAIR VULVA, EXTERNAL APPROACH: ICD-10-PCS | Performed by: OBSTETRICS & GYNECOLOGY

## 2021-03-21 PROCEDURE — 250N000011 HC RX IP 250 OP 636: Performed by: ANESTHESIOLOGY

## 2021-03-21 PROCEDURE — 250N000011 HC RX IP 250 OP 636: Performed by: OBSTETRICS & GYNECOLOGY

## 2021-03-21 PROCEDURE — 0HQ9XZZ REPAIR PERINEUM SKIN, EXTERNAL APPROACH: ICD-10-PCS | Performed by: OBSTETRICS & GYNECOLOGY

## 2021-03-21 PROCEDURE — 999N000127 HC STATISTIC PERIPHERAL IV START W US GUIDANCE

## 2021-03-21 RX ORDER — ACETAMINOPHEN 325 MG/1
650 TABLET ORAL EVERY 4 HOURS PRN
Status: DISCONTINUED | OUTPATIENT
Start: 2021-03-21 | End: 2021-03-22 | Stop reason: HOSPADM

## 2021-03-21 RX ORDER — BISACODYL 10 MG
10 SUPPOSITORY, RECTAL RECTAL DAILY PRN
Status: DISCONTINUED | OUTPATIENT
Start: 2021-03-23 | End: 2021-03-22 | Stop reason: HOSPADM

## 2021-03-21 RX ORDER — MISOPROSTOL 200 UG/1
800 TABLET ORAL
Status: DISCONTINUED | OUTPATIENT
Start: 2021-03-21 | End: 2021-03-22 | Stop reason: HOSPADM

## 2021-03-21 RX ORDER — OXYTOCIN 10 [USP'U]/ML
10 INJECTION, SOLUTION INTRAMUSCULAR; INTRAVENOUS
Status: DISCONTINUED | OUTPATIENT
Start: 2021-03-21 | End: 2021-03-22 | Stop reason: HOSPADM

## 2021-03-21 RX ORDER — LIDOCAINE 40 MG/G
CREAM TOPICAL
Status: DISCONTINUED | OUTPATIENT
Start: 2021-03-21 | End: 2021-03-21

## 2021-03-21 RX ORDER — MODIFIED LANOLIN
OINTMENT (GRAM) TOPICAL
Status: DISCONTINUED | OUTPATIENT
Start: 2021-03-21 | End: 2021-03-22 | Stop reason: HOSPADM

## 2021-03-21 RX ORDER — HYDROCORTISONE 2.5 %
CREAM (GRAM) TOPICAL 3 TIMES DAILY PRN
Status: DISCONTINUED | OUTPATIENT
Start: 2021-03-21 | End: 2021-03-22 | Stop reason: HOSPADM

## 2021-03-21 RX ORDER — OXYTOCIN/0.9 % SODIUM CHLORIDE 30/500 ML
1-24 PLASTIC BAG, INJECTION (ML) INTRAVENOUS CONTINUOUS
Status: DISCONTINUED | OUTPATIENT
Start: 2021-03-21 | End: 2021-03-21

## 2021-03-21 RX ORDER — IBUPROFEN 800 MG/1
800 TABLET, FILM COATED ORAL EVERY 6 HOURS PRN
Status: DISCONTINUED | OUTPATIENT
Start: 2021-03-21 | End: 2021-03-22 | Stop reason: HOSPADM

## 2021-03-21 RX ORDER — PRENATAL VIT/IRON FUM/FOLIC AC 27MG-0.8MG
1 TABLET ORAL DAILY
Status: DISCONTINUED | OUTPATIENT
Start: 2021-03-21 | End: 2021-03-22 | Stop reason: HOSPADM

## 2021-03-21 RX ORDER — OXYTOCIN/0.9 % SODIUM CHLORIDE 30/500 ML
100 PLASTIC BAG, INJECTION (ML) INTRAVENOUS CONTINUOUS
Status: DISCONTINUED | OUTPATIENT
Start: 2021-03-21 | End: 2021-03-22 | Stop reason: HOSPADM

## 2021-03-21 RX ORDER — TRANEXAMIC ACID 10 MG/ML
1 INJECTION, SOLUTION INTRAVENOUS EVERY 30 MIN PRN
Status: DISCONTINUED | OUTPATIENT
Start: 2021-03-21 | End: 2021-03-22 | Stop reason: HOSPADM

## 2021-03-21 RX ORDER — OXYTOCIN/0.9 % SODIUM CHLORIDE 30/500 ML
340 PLASTIC BAG, INJECTION (ML) INTRAVENOUS CONTINUOUS PRN
Status: DISCONTINUED | OUTPATIENT
Start: 2021-03-21 | End: 2021-03-22 | Stop reason: HOSPADM

## 2021-03-21 RX ORDER — AMOXICILLIN 250 MG
1 CAPSULE ORAL 2 TIMES DAILY
Status: DISCONTINUED | OUTPATIENT
Start: 2021-03-21 | End: 2021-03-22 | Stop reason: HOSPADM

## 2021-03-21 RX ORDER — AMOXICILLIN 250 MG
2 CAPSULE ORAL 2 TIMES DAILY
Status: DISCONTINUED | OUTPATIENT
Start: 2021-03-21 | End: 2021-03-22 | Stop reason: HOSPADM

## 2021-03-21 RX ADMIN — Medication 100 ML/HR: at 14:13

## 2021-03-21 RX ADMIN — ACETAMINOPHEN 650 MG: 325 TABLET, FILM COATED ORAL at 19:17

## 2021-03-21 RX ADMIN — PRENATAL VITAMINS-IRON FUMARATE 27 MG IRON-FOLIC ACID 0.8 MG TABLET 1 TABLET: at 21:02

## 2021-03-21 RX ADMIN — SODIUM CHLORIDE, POTASSIUM CHLORIDE, SODIUM LACTATE AND CALCIUM CHLORIDE: 600; 310; 30; 20 INJECTION, SOLUTION INTRAVENOUS at 10:00

## 2021-03-21 RX ADMIN — Medication 2 MILLI-UNITS/MIN: at 10:36

## 2021-03-21 RX ADMIN — DOCUSATE SODIUM 50 MG AND SENNOSIDES 8.6 MG 1 TABLET: 8.6; 5 TABLET, FILM COATED ORAL at 21:02

## 2021-03-21 RX ADMIN — IBUPROFEN 800 MG: 800 TABLET ORAL at 12:49

## 2021-03-21 RX ADMIN — ACETAMINOPHEN 650 MG: 325 TABLET, FILM COATED ORAL at 23:35

## 2021-03-21 RX ADMIN — Medication: at 11:09

## 2021-03-21 RX ADMIN — IBUPROFEN 800 MG: 800 TABLET ORAL at 19:17

## 2021-03-21 RX ADMIN — METHYLERGONOVINE MALEATE 200 MCG: 0.2 INJECTION INTRAVENOUS at 12:22

## 2021-03-21 NOTE — L&D DELIVERY NOTE
VAGINAL DELIVERY PROCEDURE NOTE    PREOPERATIVE DIAGNOSIS:  1. Intrauterine pregnancy at 37w0d  2. Induction of labor  3. Dichorionic diamniotic twin gestation    POSTOPERATIVE DIAGNOSIS:   1. Status post vaginal delivery times 2  2. Small first degree perineal laceration, 5 mm left periurethral laceration    PROCEDURE DATE: 2021    PROCEDURE:   1.  times 2  2. Laceration repair      STAFF SURGEON: Melany Albright MD    ANESTHESIA: epidural    COMPLICATIONS: none    EBL: 600 cc.     SPECIMENS: cord blood times 2, placentas (fused)    FINDINGS: Baby A female infant, Apgar scores of 9 and 9, weight 6 lb 9 oz.  Delivered in OA vertex presentation. Placenta with 3 vessel cord. Meconium: none. Baby B female infant, Apgar scores 9 and 9, weight 7lb 10 oz,     INDICATIONS:  This is a 30 year old  with intrauterine pregnancy at 37w0d who presented to Labor and Delivery two days ago for advanced cervical dilation at 36+5, made no change, and was watched on L&D until 37 weeks. Her pregnancy has been complicated only by di-di twin gestation, with mild polyhydramnios in twin B which resolved by the time of the last ultrasound on 3/16.     PROCEDURE:  The patient was complete and pushing in the OR. Infant A head was delivered atraumatically over perineum in the OA position. Nuchal cord: none. Anterior shoulder and posterior shoulders were easily delivered without problems followed by remainder of infant. Infant vigorous and crying. Drying and resuscitative measures performed. Immediate cord clamping was performed prior to cutting. Cord gases were not obtained. Cord blood was collected. The baby was handed off to awaiting NICU staff. Baby B was noted to be cephalic on ultrasound, and exam revealed the head was at a -2 station and the cervix completely dilated. AROM performed with clear fluid noted. Baby B delivered over an intact perineum in the OA position, anterior shoulder and posterior shoulders were  easily delivered without problems followed by remainder of infant. Infant vigorous and crying. Drying and resuscitative measures performed. Immediate cord clamping was performed prior to cutting. Cord gases were not obtained. Cord blood was collected. The baby was handed off to awaiting NICU staff.  Pitocin in IV fluid was administered per protocol. The placenta delivered spontaneously intact with 3 vessel cord times 2 and revealed normal, fused dichorionic diamniotic anatomy. Uterine massage was performed and the fundus was found to be firm. Vagina, cervix, and perineum were inspected for lacerations. Small first degree superficial perineal laceration was noted and did not need repair; small left periurethral laceration was noted, was bleeding, and was repaired with 3.0 vicryl. All counts were correct. Mom and both infants stable in room, transferred back to L&D room for recovery.    Dr. Correa's assistance was needed for ultrasound evaluation of twin B after delivery of twin A, and for standby services for twin delivery.    Melany Albright MD  12:52 PM  March 21, 2021

## 2021-03-21 NOTE — ANESTHESIA PREPROCEDURE EVALUATION
Anesthesia Pre-Procedure Evaluation    Patient: Zi Mirza   MRN: [id number] : 1990        Preoperative Diagnosis: * No surgery found *   Procedure :      Past Medical History:   Diagnosis Date     No pertinent past medical history       Past Surgical History:   Procedure Laterality Date     NO HISTORY OF SURGERY        No Known Allergies   Social History     Tobacco Use     Smoking status: Never Smoker     Smokeless tobacco: Never Used   Substance Use Topics     Alcohol use: No     Comment: rare      Wt Readings from Last 1 Encounters:   21 126.1 kg (278 lb)        Anesthesia Evaluation   Pt has had prior anesthetic.     No history of anesthetic complications       ROS/MED HX  ENT/Pulmonary:  - neg pulmonary ROS     Neurologic:  - neg neurologic ROS     Cardiovascular:  - neg cardiovascular ROS     METS/Exercise Tolerance:     Hematologic:  - neg hematologic  ROS     Musculoskeletal:       GI/Hepatic:  - neg GI/hepatic ROS     Renal/Genitourinary:       Endo:  - neg endo ROS     Psychiatric/Substance Use:  - neg psychiatric ROS     Infectious Disease:       Malignancy:       Other:      (+) , twin IUP,  (-) previous  and TOLAC candidate       Physical Exam    Airway        Mallampati: II   TM distance: > 3 FB   Neck ROM: full   Mouth opening: > 3 cm    Respiratory Devices and Support         Dental  no notable dental history         Cardiovascular   cardiovascular exam normal          Pulmonary   pulmonary exam normal                OUTSIDE LABS:  CBC:   Lab Results   Component Value Date    WBC 8.0 2021    WBC 9.5 2018    HGB 11.1 (L) 2021    HGB 10.0 (L) 2021    HCT 31.1 (L) 2021    HCT 31.9 10/06/2020     2021     10/06/2020     BMP:   Lab Results   Component Value Date     2018     10/10/2016    POTASSIUM 3.6 2018    POTASSIUM 3.7 10/10/2016    CHLORIDE 108 2018    CHLORIDE 109 10/10/2016    CO2 24  06/12/2018    CO2 22 10/10/2016    BUN 11 06/12/2018    BUN 10 10/10/2016    CR 0.83 06/12/2018    CR 0.84 10/10/2016     (H) 06/12/2018     (H) 10/10/2016     COAGS: No results found for: PTT, INR, FIBR  POC:   Lab Results   Component Value Date    HCG Positive (A) 08/06/2020    HCGS Negative 10/10/2016     HEPATIC:   Lab Results   Component Value Date    ALBUMIN 3.9 10/10/2016    PROTTOTAL 8.3 10/10/2016    ALT 24 10/10/2016    AST 13 10/10/2016    ALKPHOS 87 10/10/2016    BILITOTAL 0.3 10/10/2016     OTHER:   Lab Results   Component Value Date    LINA 8.4 (L) 06/12/2018    LIPASE 97 10/10/2016       Anesthesia Plan    ASA Status:  2      Anesthesia Type: Epidural.              Consents    Anesthesia Plan(s) and associated risks, benefits, and realistic alternatives discussed. Questions answered and patient/representative(s) expressed understanding.     - Discussed with:  Patient         Postoperative Care            Comments:                Nasim Mcclain MD

## 2021-03-21 NOTE — ANESTHESIA PROCEDURE NOTES
Epidural catheter Procedure Note  Pre-Procedure   Staff -        Anesthesiologist:  Nasim Mcclain MD       Performed By: anesthesiologist       Referred By: Jose Ramon       Location: OB       Procedure Start/Stop Times: 3/21/2021 9:00 AM and 3/21/2021 9:18 AM       Pre-Anesthestic Checklist: patient identified, IV checked, risks and benefits discussed, informed consent, monitors and equipment checked, pre-op evaluation and at physician/surgeon's request  Timeout:       Correct Patient: Yes        Correct Procedure: Yes        Correct Site: Yes        Correct Position: Yes   Procedure Documentation  Procedure: epidural catheter       Patient Position: sitting       Patient Prep/Sterile Barriers: sterile gloves, mask, patient draped       Skin prep: Betadine      Local skin infiltrated with mL of 1% lidocaine.        Insertion Site: L3-4. (midline approach).       Technique: LORT saline        BRAD at 6 cm.       Needle Type: Touhy needle       Needle Gauge: 17.        Needle Length (Inches): 3.5        Catheter: 19 G.         Catheter threaded easily.         7 cm epidural space.         Threaded 13 cm at skin.        # of attempts: 1 and  # of redirects:     Assessment/Narrative         Paresthesias: No.       Insertion/Infusion Method: LORT saline       Aspiration negative for Heme or CSF via Epidural Catheter.    Comments:  No test dose or bolus given; dry catheter insertion into multip with twin gestation at 6cm but not active labor.

## 2021-03-21 NOTE — PLAN OF CARE
Data: Vital signs within normal limits. Postpartum checks within normal limits - see flow record. Patient eating and drinking normally. Patient able to empty bladder independently and is up ambulating. No apparent signs of infection.  First degree incision  healing well. Patient performing self cares and is able to care for infant.  Action: Patient medicated during the shift for pain and cramping. See MAR. Patient reassessed within 1 hour after each medication and pain was improved - patient stated she was comfortable. Patient education done. See flow record.  Response: Positive attachment behaviors observed with infant. Support persons is present.   Plan: Anticipate discharge on 3/23/2021.

## 2021-03-21 NOTE — PROVIDER NOTIFICATION
03/21/21 0720   Provider Notification   Provider Name/Title Dr. Correa   Method of Notification At Bedside   Request Evaluate in Person   Notification Reason Status Update     MD at bedside discussing the plan of care with the patient.  Patient wants to shower and walk around the unit for a bit.  When she is back in the room anesthesia will be up to place a dry epidural catheter and we will start pitocin.  Patient knows that she will push in the operating room and she is okay with that.  Both the patient and partner are agreeable to the plan of care. Will update MD as necessary.

## 2021-03-21 NOTE — PLAN OF CARE
Patient stable this shift. Occasional contractions which patient has not felt. No bloody show noted. NSTs reactive. SCDs placed and patient educated to call prior to ambulation. Patient desires being able to ambulate in the halls prior to the induction of labor in the morning and she would like to have a mirror available at delivery if possible. Bedside report given to Ghislaine JONES RN whom will assume cares at this time.

## 2021-03-21 NOTE — ADDENDUM NOTE
Addendum  created 03/21/21 1149 by Nasim Mcclain MD    Intraprocedure Event edited, Review and Sign - Ready for Procedure

## 2021-03-21 NOTE — PROGRESS NOTES
OB PN    Comfortable and anxious for delivery.    Discussed in detail yesterday evening with patient and  the issues/concerns and risks associated with vaginal delivery of twins.    Hx of uneventful  of an 8lb infant 5 yrs ago.  Both patient and partner recall an\ very uneventful labor and short (just a few pushes) 2nd stage.    Babies are Vtx/Vtx with EFWs both 95%tile or > on serial U/S.  EFWs 7 nad 8 lbs respectively.    Patient and partner understand the concerns with delivery of a non vtx baby 2.  ECV or C/S for 2nds twin discussed.    Desire for epidural discussed.    Will begin pitocin induction after she showers this morning.    Anesthesia aware and NICU will be notified to attend delivery     Romaine Correa MD

## 2021-03-21 NOTE — PLAN OF CARE
Patient in stable condition this shift. VSS. NSTs reactive for both babies. No bloody show or leaking of fluid. Patient not feeling infrequent contractions. Patient able to sleep throughout the night. Report given to Agnes LUCERO RN who will assume cares at this time.

## 2021-03-21 NOTE — PROGRESS NOTES
Epidural catheter placed without difficulty, not using yet as she is not uncomfortable with contractions.  Pitocin started after IV replaced (first one infiltrated).  Discussed AROM and placement of FSE, she consented. Twin A has been difficult to trace at times externally.  AROM performed, fluid clear. FSE placed without issues.  6.5/80/-2 station. Cephalic.    Continue to monitor.   Will deliver in the OR, anticipate vaginal delivery.    Melany Albright MD

## 2021-03-21 NOTE — PLAN OF CARE
Data: Zi Mirza transferred to [id number] via wheelchair at 1845. Baby transferred via parent's arms.  Action: Receiving unit notified of transfer: Yes. Patient and family notified of room change. Report given to Karrie ARIAS at 1915. Belongings sent to receiving unit. Accompanied by Registered Nurse. Oriented patient to surroundings. Call light within reach. ID bands double-checked with receiving RN.  Response: Patient tolerated transfer and is stable.

## 2021-03-21 NOTE — LACTATION NOTE
Lactation visit. Patient stated breastfeeding going well with baby A but that Baby B suckles more at the breast. Discussed normal course of lactation, tandem positions,  feeding behavior. Patient stated she knew how to hand express. Encouraged hand expression prior to feed to help infants latch. Patient  her 5 year old son for 1 yr. Patient requested follow up visit tomorrow.

## 2021-03-22 VITALS
OXYGEN SATURATION: 97 % | HEIGHT: 69 IN | HEART RATE: 90 BPM | DIASTOLIC BLOOD PRESSURE: 72 MMHG | RESPIRATION RATE: 18 BRPM | TEMPERATURE: 97.2 F | BODY MASS INDEX: 41.05 KG/M2 | SYSTOLIC BLOOD PRESSURE: 140 MMHG

## 2021-03-22 LAB
HGB BLD-MCNC: 10.4 G/DL (ref 11.7–15.7)
PLATELET # BLD AUTO: 163 10E9/L (ref 150–450)

## 2021-03-22 PROCEDURE — 85018 HEMOGLOBIN: CPT | Performed by: OBSTETRICS & GYNECOLOGY

## 2021-03-22 PROCEDURE — 85049 AUTOMATED PLATELET COUNT: CPT | Performed by: OBSTETRICS & GYNECOLOGY

## 2021-03-22 PROCEDURE — 250N000013 HC RX MED GY IP 250 OP 250 PS 637: Performed by: OBSTETRICS & GYNECOLOGY

## 2021-03-22 PROCEDURE — 36415 COLL VENOUS BLD VENIPUNCTURE: CPT | Performed by: OBSTETRICS & GYNECOLOGY

## 2021-03-22 PROCEDURE — 250N000011 HC RX IP 250 OP 636: Performed by: OBSTETRICS & GYNECOLOGY

## 2021-03-22 RX ORDER — IBUPROFEN 800 MG/1
800 TABLET, FILM COATED ORAL EVERY 8 HOURS PRN
Qty: 60 TABLET | Refills: 1 | Status: SHIPPED | OUTPATIENT
Start: 2021-03-22 | End: 2022-09-06

## 2021-03-22 RX ADMIN — ENOXAPARIN SODIUM 40 MG: 40 INJECTION SUBCUTANEOUS at 09:29

## 2021-03-22 RX ADMIN — IBUPROFEN 800 MG: 800 TABLET ORAL at 08:55

## 2021-03-22 RX ADMIN — IBUPROFEN 800 MG: 800 TABLET ORAL at 00:46

## 2021-03-22 RX ADMIN — ACETAMINOPHEN 650 MG: 325 TABLET, FILM COATED ORAL at 03:32

## 2021-03-22 RX ADMIN — DOCUSATE SODIUM 50 MG AND SENNOSIDES 8.6 MG 1 TABLET: 8.6; 5 TABLET, FILM COATED ORAL at 08:52

## 2021-03-22 RX ADMIN — PRENATAL VITAMINS-IRON FUMARATE 27 MG IRON-FOLIC ACID 0.8 MG TABLET 1 TABLET: at 08:52

## 2021-03-22 RX ADMIN — ACETAMINOPHEN 650 MG: 325 TABLET, FILM COATED ORAL at 08:55

## 2021-03-22 NOTE — LACTATION NOTE
Lactation in to see patient. Patient states history of good milk supply with first baby. Twin A, has a harder time with latching, parents and bedside nurse state. Twin B easier to latch. Assisted with a feed. Twin A more difficult, does close her mouth quickly and mother moving body to baby. Encouraged patient to hold baby's head not have it resting on the boppy to bet better control of head and breast. Reminded patient to bring baby to breast. Assisted and nice deep latch achieved with some swallows heard. Twin B eager to latch. Educated on making sure Twin B switches sides each feed as she is the stronger feeder. Mother love cream given for some tenderness. Parents wanting discharge home this evening. All questions and concerns addressed.

## 2021-03-22 NOTE — PLAN OF CARE
Data: Vital signs within normal limits. Postpartum checks within normal limits, see flow record. Patient eating and drinking normally. Patient able to empty bladder independently and is up ambulating. No apparent signs of infection.  Periurethral laceration  healing well, using Tucks, Ice, Ibuprofen and Tylenol with relief. Patient performing self cares and is able to care for infants with some assistance.  Action: Patient medicated during the shift for pain and cramping. See MAR. Patient reassessed within 1 hour after each medication and pain was improved or patient was sleeping. Patient education done about safe infant sleep, basic infant cares, bulb syringe usage, Breastfeeding cues, positions, and satiety. Tips for feeding twins. Self cares and pain management. See flow record. PP and  booklet reviewed and questions answered.   Response: Positive attachment behaviors observed with infant. Support person Eriberto present and attentive to patient and infants.   Plan: Will continue to monitor, assess, and prepare for discharge. Anticipate discharge on 21.

## 2021-03-22 NOTE — PROGRESS NOTES
Data: Vital signs within normal limits. Postpartum checks within normal limits - see flow record. Patient eating and drinking normally. Patient able to empty bladder independently and is up ambulating. No apparent signs of infection.  Patient performing self cares and is able to care for infant.  Action: Patient medicated during the shift for pain with tylenol and ibuprofen. See MAR. Patient reassessed within 1 hour after each medication and pain was improved - patient stated she was comfortable. Patient education complete. See flow record.  Response: Positive attachment behaviors observed with infant. Support persons  present.   Plan: Anticipate discharge today. AVS read to patient and all questions and concerns addressed. Patient to follow up with clinic in 6 weeks.

## 2021-03-22 NOTE — DISCHARGE INSTRUCTIONS
Follow up in 6  weeks   Call [phone] for appointment     Call and ask to be seen or talk to a doctor for the following: (You can go to the ob triage button   On answering service line) .     Increased bleeding, fever, general unwell feeling or increased pain.     Dr. Delphine Miguel, DO    OB/GYN   [facility]    Postpartum Vaginal Delivery Instructions    Activity       Ask family and friends for help when you need it.    Do not place anything in your vagina for 6 weeks.    You are not restricted on other activities, but take it easy for a few weeks to allow your body to recover from delivery.  You are able to do any activities you feel up to that point.    No driving until you have stopped taking your pain medications (usually two weeks after delivery).     Call your health care provider if you have any of these symptoms:       Increased pain, swelling, redness, or fluid around your stiches from an episiotomy or perineal tear.    A fever above 100.4 F (38 C) with or without chills when placing a thermometer under your tongue.    You soak a sanitary pad with blood within 1 hour, or you see blood clots larger than a golf ball.    Bleeding that lasts more than 6 weeks.    Vaginal discharge that smells bad.    Severe pain, cramping or tenderness in your lower belly area.    A need to urinate more frequently (use the toilet more often), more urgently (use the toilet very quickly), or it burns when you urinate.    Nausea and vomiting.    Redness, swelling or pain around a vein in your leg.    Problems breastfeeding or a red or painful area on your breast.    Chest pain and cough or are gasping for air.    Problems coping with sadness, anxiety, or depression.  If you have any concerns about hurting yourself or the baby, call your provider immediately.     You have questions or concerns after you return home.     Keep your hands clean:  Always wash your hands before touching your  perineal area and stitches.  This helps reduce your risk of infection.  If your hands aren't dirty, you may use an alcohol hand-rub to clean your hands. Keep your nails clean and short.

## 2021-03-22 NOTE — DISCHARGE SUMMARY
30 year old  y/o  s/p  ppd # 1, twin vaginal delivery  hemoglobin is   Hemoglobin   Date Value Ref Range Status   2021 10.4 (L) 11.7 - 15.7 g/dL Final   ]    d/c home   On colace, breast pump and ibuprofen  Follow-up in 6 weeks for post partum examination    [facility]   Post-Partum Progress Note          Assessment and Plan:    Assessment:   Post-partum day #1  Normal spontaneous vaginal delivery  L&D complications: Twin vaginal del      Doing well.  lovenox today, ok for discharge home      Plan:   Ambulation encouraged  Discharge later today           Interval History:   Doing well.  Pain is well-controlled.  No fevers.  No history of foul-smelling vaginal discharge.  Good appetite.  Denies chest pain, shortness of breath, nausea or vomiting.  Vaginal bleeding is similar to a heavy menstrual flow.  Ambulatory.  Breastfeeding well.          Significant Problems:    None          Review of Systems:    CONSTITUTIONAL: NEGATIVE for fever, chills, change in weight  INTEGUMENTARY/SKIN: NEGATIVE for worrisome rashes, moles or lesions  EYES: NEGATIVE for vision changes or irritation  ENT/MOUTH: NEGATIVE for ear, mouth and throat problems  RESP: NEGATIVE for significant cough or SOB  BREAST: NEGATIVE for masses, tenderness or discharge  CV: NEGATIVE for chest pain, palpitations or peripheral edema  GI: NEGATIVE for nausea, abdominal pain, heartburn, or change in bowel habits  : NEGATIVE for frequency, dysuria, or hematuria  MUSCULOSKELETAL: NEGATIVE for significant arthralgias or myalgia  NEURO: NEGATIVE for weakness, dizziness or paresthesias  ENDOCRINE: NEGATIVE for temperature intolerance, skin/hair changes  HEME: NEGATIVE for bleeding problems  PSYCHIATRIC: NEGATIVE for changes in mood or affect          Medications:   All medications related to the patient's surgery have been reviewed  -          Physical Exam:     All vitals stable  Patient Vitals for the past 12 hrs:   BP Temp Temp  src Pulse Resp   03/21/21 2352 132/67 98.5  F (36.9  C) Oral 96 16     Uterine fundus is firm, non-tender and at the level of the umbilicus          Data:     All laboratory data related to this surgery reviewed  Hemoglobin   Date Value Ref Range Status   03/22/2021 10.4 (L) 11.7 - 15.7 g/dL Final   03/19/2021 11.1 (L) 11.7 - 15.7 g/dL Final   01/05/2021 10.0 (L) 11.7 - 15.7 g/dL Final   10/06/2020 10.7 (A) 11.7 - 15.7 g/dL Final   06/12/2018 12.4 11.7 - 15.7 g/dL Final     -    Delphine Miguel, DO Dr. Delphine Miguel, DO    OB/GYN   [facility] and [facility]

## 2021-03-22 NOTE — PROGRESS NOTES
[facility]   Post-Partum Progress Note          Assessment and Plan:    Assessment:   Post-partum day #1  Normal spontaneous vaginal delivery  L&D complications: Twin vaginal del      Doing well.  lovenox today, ok for discharge home      Plan:   Ambulation encouraged  Discharge later today           Interval History:   Doing well.  Pain is well-controlled.  No fevers.  No history of foul-smelling vaginal discharge.  Good appetite.  Denies chest pain, shortness of breath, nausea or vomiting.  Vaginal bleeding is similar to a heavy menstrual flow.  Ambulatory.  Breastfeeding well.          Significant Problems:    None          Review of Systems:    CONSTITUTIONAL: NEGATIVE for fever, chills, change in weight  INTEGUMENTARY/SKIN: NEGATIVE for worrisome rashes, moles or lesions  EYES: NEGATIVE for vision changes or irritation  ENT/MOUTH: NEGATIVE for ear, mouth and throat problems  RESP: NEGATIVE for significant cough or SOB  BREAST: NEGATIVE for masses, tenderness or discharge  CV: NEGATIVE for chest pain, palpitations or peripheral edema  GI: NEGATIVE for nausea, abdominal pain, heartburn, or change in bowel habits  : NEGATIVE for frequency, dysuria, or hematuria  MUSCULOSKELETAL: NEGATIVE for significant arthralgias or myalgia  NEURO: NEGATIVE for weakness, dizziness or paresthesias  ENDOCRINE: NEGATIVE for temperature intolerance, skin/hair changes  HEME: NEGATIVE for bleeding problems  PSYCHIATRIC: NEGATIVE for changes in mood or affect          Medications:   All medications related to the patient's surgery have been reviewed  -          Physical Exam:     All vitals stable  Patient Vitals for the past 12 hrs:   BP Temp Temp src Pulse Resp   03/21/21 2352 132/67 98.5  F (36.9  C) Oral 96 16     Uterine fundus is firm, non-tender and at the level of the umbilicus          Data:     All laboratory data related to this surgery reviewed  Hemoglobin   Date Value Ref Range Status   03/22/2021 10.4 (L)  11.7 - 15.7 g/dL Final   03/19/2021 11.1 (L) 11.7 - 15.7 g/dL Final   01/05/2021 10.0 (L) 11.7 - 15.7 g/dL Final   10/06/2020 10.7 (A) 11.7 - 15.7 g/dL Final   06/12/2018 12.4 11.7 - 15.7 g/dL Final     -    DO Dr. Delphine Tenorio, DO    OB/GYN   [facility] and [facility]

## 2021-03-23 LAB — COPATH REPORT: NORMAL

## 2021-03-24 ENCOUNTER — MEDICAL CORRESPONDENCE (OUTPATIENT)
Dept: HEALTH INFORMATION MANAGEMENT | Facility: CLINIC | Age: 31
End: 2021-03-24

## 2021-05-18 ENCOUNTER — PRENATAL OFFICE VISIT (OUTPATIENT)
Dept: OBGYN | Facility: CLINIC | Age: 31
End: 2021-05-18
Payer: COMMERCIAL

## 2021-05-18 VITALS
HEIGHT: 69 IN | DIASTOLIC BLOOD PRESSURE: 80 MMHG | BODY MASS INDEX: 34.8 KG/M2 | WEIGHT: 235 LBS | SYSTOLIC BLOOD PRESSURE: 124 MMHG

## 2021-05-18 DIAGNOSIS — Z12.4 SCREENING FOR CERVICAL CANCER: ICD-10-CM

## 2021-05-18 PROCEDURE — G0145 SCR C/V CYTO,THINLAYER,RESCR: HCPCS | Performed by: OBSTETRICS & GYNECOLOGY

## 2021-05-18 PROCEDURE — 99207 PR POST PARTUM EXAM: CPT | Performed by: OBSTETRICS & GYNECOLOGY

## 2021-05-18 ASSESSMENT — MIFFLIN-ST. JEOR: SCORE: 1860.33

## 2021-05-18 ASSESSMENT — PATIENT HEALTH QUESTIONNAIRE - PHQ9: SUM OF ALL RESPONSES TO PHQ QUESTIONS 1-9: 0

## 2021-05-18 NOTE — NURSING NOTE
"Chief Complaint   Patient presents with     Postpartum Care       Initial /80   Ht 1.753 m (5' 9\")   Wt 106.6 kg (235 lb)   LMP 2020 (Exact Date)   Breastfeeding Yes   BMI 34.70 kg/m   Estimated body mass index is 34.7 kg/m  as calculated from the following:    Height as of this encounter: 1.753 m (5' 9\").    Weight as of this encounter: 106.6 kg (235 lb).  BP completed using cuff size: regular    Questioned patient about current smoking habits.  Pt. has never smoked.          The following HM Due: pap smear      The following patient reported/Care Every where data was sent to:  P ABSTRACT QUALITY INITIATIVES [[id number]]  Lisa Cotton LPN               "

## 2021-05-18 NOTE — PROGRESS NOTES
"Zi is here for a 6-week postpartum checkup.    She had a  of a viable twin girls, weight 6 pounds 9 oz. And 7 pounds 10oz with no complications. Date of delivery was 21. Since delivery, she has been breast feeding.  She has no signs of infection, bleeding or other complications.  She is pregnant.  We discussed contraception and she has chosen condoms.      Post partum tubal: No  History of Gestational Diabetes? No  Type of Delivery:  Vaginal  Feeding Method:  Breast and formula  If initiated breast feeding and stopped, how long did you breast feed?:  Not applicable    REVIEW OF SYSTEMS:  Negative.  Contraception Plan: condoms    EXAM:  /80   Ht 1.753 m (5' 9\")   Wt 106.6 kg (235 lb)   LMP 2020 (Exact Date)   Breastfeeding Yes   BMI 34.70 kg/m    HEENT: grossly normal.  ABDOMEN: soft, non tender, without masses rebound, guarding or tenderness.  PELVIC:  External genitalia; normal without lesion, repair well healed.   Vagina: normal mucosa and rugae, no discharge.  Cervix: multiparous, well healed, without lesion.  Uterus: non pregnant in size, firm , mobile, no lesions,     Normal shape, position and consistency  Adnexa: non tender, without masses.  EXTREMITIES:  warm to touch, good pulses, no ankle edema or calf tenderness.  NEUROLOGIC: grossly normal.    ASSESSMENT:   6-week postpartum exam after .    PLAN:    Follow up in 1 year  PAP smear obtained.  One-hour glucose tolerance test needed? No  Condoms for contraception.    Melany Albright MD    "

## 2021-05-20 LAB
COPATH REPORT: NORMAL
PAP: NORMAL

## 2021-10-03 ENCOUNTER — HOSPITAL ENCOUNTER (EMERGENCY)
Facility: CLINIC | Age: 31
Discharge: HOME | End: 2021-10-03
Attending: EMERGENCY MEDICINE | Admitting: EMERGENCY MEDICINE
Payer: OTHER GOVERNMENT

## 2021-10-03 ENCOUNTER — TELEPHONE (OUTPATIENT)
Dept: EMERGENCY MEDICINE | Facility: CLINIC | Age: 31
End: 2021-10-03

## 2021-10-03 VITALS
BODY MASS INDEX: 34.07 KG/M2 | HEIGHT: 69 IN | HEART RATE: 114 BPM | OXYGEN SATURATION: 97 % | RESPIRATION RATE: 18 BRPM | SYSTOLIC BLOOD PRESSURE: 141 MMHG | WEIGHT: 230 LBS | TEMPERATURE: 100.4 F | DIASTOLIC BLOOD PRESSURE: 86 MMHG

## 2021-10-03 DIAGNOSIS — J02.9 ACUTE PHARYNGITIS, UNSPECIFIED ETIOLOGY: ICD-10-CM

## 2021-10-03 DIAGNOSIS — Z20.822 SUSPECTED 2019 NOVEL CORONAVIRUS INFECTION: ICD-10-CM

## 2021-10-03 LAB
DEPRECATED S PYO AG THROAT QL EIA: NEGATIVE
GROUP A STREP BY PCR: NOT DETECTED
SARS-COV-2 RNA RESP QL NAA+PROBE: POSITIVE

## 2021-10-03 PROCEDURE — 250N000013 HC RX MED GY IP 250 OP 250 PS 637: Performed by: EMERGENCY MEDICINE

## 2021-10-03 PROCEDURE — 99283 EMERGENCY DEPT VISIT LOW MDM: CPT

## 2021-10-03 PROCEDURE — U0003 INFECTIOUS AGENT DETECTION BY NUCLEIC ACID (DNA OR RNA); SEVERE ACUTE RESPIRATORY SYNDROME CORONAVIRUS 2 (SARS-COV-2) (CORONAVIRUS DISEASE [COVID-19]), AMPLIFIED PROBE TECHNIQUE, MAKING USE OF HIGH THROUGHPUT TECHNOLOGIES AS DESCRIBED BY CMS-2020-01-R: HCPCS | Performed by: EMERGENCY MEDICINE

## 2021-10-03 PROCEDURE — 87651 STREP A DNA AMP PROBE: CPT | Performed by: EMERGENCY MEDICINE

## 2021-10-03 PROCEDURE — C9803 HOPD COVID-19 SPEC COLLECT: HCPCS

## 2021-10-03 RX ORDER — IBUPROFEN 600 MG/1
600 TABLET, FILM COATED ORAL ONCE
Status: COMPLETED | OUTPATIENT
Start: 2021-10-03 | End: 2021-10-03

## 2021-10-03 RX ADMIN — IBUPROFEN 600 MG: 600 TABLET ORAL at 04:42

## 2021-10-03 ASSESSMENT — ENCOUNTER SYMPTOMS
HEADACHES: 0
COUGH: 1
SHORTNESS OF BREATH: 0
FEVER: 1
TROUBLE SWALLOWING: 0
SORE THROAT: 1

## 2021-10-03 ASSESSMENT — MIFFLIN-ST. JEOR: SCORE: 1837.65

## 2021-10-03 NOTE — ED PROVIDER NOTES
"  History   Chief Complaint:  Pharyngitis    The history is provided by the patient.      Zi Mirza is a 30 year old female, otherwise healthy, who presents with mild cough, sore throat, and fever beginning yesterday. She reports pain with swallowing but denies any difficulty of this. The patient works at a [employer] and notes that her coworker was recently diagnosed with strep throat. She denies any shortness of breath, loss of taste or smell, or headache. She is not COVID-19 vaccinated.    Review of Systems   Constitutional: Positive for fever.   HENT: Positive for sore throat. Negative for trouble swallowing.         (-) loss of taste or smell   Respiratory: Positive for cough. Negative for shortness of breath.    Neurological: Negative for headaches.   All other systems reviewed and are negative.        Allergies:  No known drug allergies    Medications:  Aspirin  Fluticasone  Sodium chloride  Sumatriptan  Norgestimate Estradiol  Ferrous gluconate  Docusate sodium    Past Medical History:    Nexplanon in place  Allergy-induced asthma    Family History:    Hypertension  Asthma    Social History:  The patient presented alone today.  She arrived in a private vehicle.    Physical Exam     Patient Vitals for the past 24 hrs:   BP Temp Temp src Pulse Resp SpO2 Height Weight   10/03/21 0535 -- 100.4  F (38  C) -- -- -- -- -- --   10/03/21 0419 -- -- -- -- -- 97 % -- --   10/03/21 0417 (!) 141/86 (!) 102.4  F (39.1  C) Oral 114 18 -- 1.753 m (5' 9\") 104.3 kg (230 lb)     Physical Exam  Constitutional:  Oriented to person, place, and time. Non-toxic appearing.  HENT:   Head:    Normocephalic.   Mouth/Throat:   Oropharynx is clear and moist. Oral posterior pharyngeal erythremia. No enlarged tonsils. No exudates. No uvular deviation.  Eyes:    EOM are normal. Pupils are equal, round, and reactive to light.   Ears:   TMs clear.  Neck:    Neck supple. No lymphadenopathy.  Cardiovascular:  Normal rate, regular rhythm and " normal heart sounds.      Exam reveals no gallop and no friction rub.       No murmur heard.  Pulmonary/Chest:  Effort normal and breath sounds normal.      No respiratory distress. No wheezes. No rales.      No reproducible chest wall pain.  Abdominal:   Soft. No distension. No tenderness. No rebound and no guarding.   Musculoskeletal:  Normal range of motion.   Neurological:   Alert and oriented to person, place, and time. No meningeal signs. Moves all 4 extremities spontaneously    Skin:    No rash noted. No pallor.    Emergency Department Course     Laboratory:    Streptococcus A Rapid with Reflex to PCR: Negative  Group A Streptococcus PCR Throat Swab: Pending    Symptomatic COVID-19 Virus (Coronavirus) by PCR Nasopharyngeal: Pending    Emergency Department Course:    Reviewed:  I reviewed nursing notes, vitals, past medical history and care everywhere.    Assessments:  0423 I obtained history and examined the patient as noted above.   0532 I believe that they patient is safe for discharge at this time.    Interventions:  0442 Ibuprofen 600 mg PO    Disposition:  The patient was discharged to home.     Impression & Plan     Medical Decision Making:    Zi Mirza is a 30 year old female who presents for evaluation of a sore throat and clinical evidence of pharyngitis with symptoms that are concerning for possible COVID19. The rapid strep test is negative, and formal culture has been set up in the lab. The COVID-19 test is pending. Vital signs are reassuring.  The patient is not hypoxic.  The patient's work of breathing is normal.  Mentation is normal. There is no clinical evidence of peritonsillar abscess, retropharyngeal abscess, Lemierre's Syndrome, epiglottis, or Jcarlos's angina. The etiology is most likely viral. At this time, I believe the patient does not meet criteria for hospitalization. I have recommended treatment with analgesics, and we will await formal culture results.  If the culture is  positive, an ED physician will call the patient to initiate anti-microbial therapy. The patient was given the current [place] Department of Health guidelines on self-isolation.  They are asked to follow-up via telemetry medicine.  They are asked to drink plenty of fluids.  They are asked to return if they develop severe difficulty in breathing or worsening. Return if increasing pain, change in voice, neck pain, vomiting, fever, or shortness of breath. Follow-up with primary physician if not improving in 3-5 days. Given well appearance, I would not test further for other etiologies of serious bacterial infections.    Covid-19  Zi Mirza was evaluated during a global COVID-19 pandemic, which necessitated consideration that the patient might be at risk for infection with the SARS-CoV-2 virus that causes COVID-19.   Applicable protocols for evaluation were followed during the patient's care.   COVID-19 was considered as part of the patient's evaluation. The plan for testing is:  a test was obtained during this visit.    Diagnosis:    ICD-10-CM    1. Acute pharyngitis, unspecified etiology  J02.9    2. Suspected 2019 novel coronavirus infection  Z20.822      Scribe Disclosure:  Silvina FOY, am serving as a scribe at 4:22 AM on 10/3/2021 to document services personally performed by Berlin Dunham MD based on my observations and the provider's statements to me.       Berlin Dunham MD  10/03/21 0616

## 2021-10-03 NOTE — DISCHARGE INSTRUCTIONS
Discharge Instructions  COVID-19    COVID-19 is the disease caused by a new coronavirus. The virus spreads from person-to-person primarily by droplets when an infected person coughs or sneezes and the droplets are then breathed in by another person. There are tests available to diagnose COVID-19. You may have been diagnosed with COVID, may be being tested for COVID and have a pending test result, or may have been exposed to COVID.    Symptoms of COVID-19  Many people have no symptoms or mild symptoms.  Symptoms may usually appear 4 to 5 days (up to 14 days) after contact with a person with COVID-19. Some people will get severe symptoms and pneumonia. Usual symptoms are:     ? Fever  ? Cough  ? Trouble breathing    Less common symptoms are: Headache, body aches, sore throat, sneezing, diarrhea, loss of taste or smell.    Isolation and Quarantine    You may have been seen because you have symptoms, had an exposure, or had some other concern about possible COVID. The best way to stop the spread of the virus is to avoid contact with others.    Isolation refers to sick people staying away from people who are not sick. A person in quarantine is limiting activity because they were exposed and are waiting to see if they might become sick.    If you test positive for COVID, you should stay home (isolation) for at least 10 days after your symptoms began, and for 24 hours with no fever and improvement of symptoms--whichever is longer. (Your fever should be gone for 24 hours without using fever-reducing medicine). If you have no symptoms, you should stay home (isolation) for 10 days from the day of the test. If you have been vaccinated for COVID, the vaccination will not cause you to test positive so a positive test result generally is a  true positive .    For example, if you have a fever and cough for 6 days, you need to stay home 4 more days with no fever for a total of 10 days. Or, if you have a fever and cough for 10 days,  you need to stay home one more day with no fever for a total of 11 days.    If you have a high-risk exposure to COVID (you spent 15 minutes or more within six feet of somebody who has COVID), you should stay home (quarantine) for 14 days, unless you are vaccinated. Even if you test negative for COVID, the CDC recommends a 14-day quarantine from the time of your last exposure to that individual (unless you are vaccinated). There are options for a shortened (<14 day quarantine) you can review at:  https://www.health.[facility]./diseases/coronavirus/close.html#long    If you live in the same house as somebody with COVID and cannot separate from them, you will need to quarantine for 14-days after that person's isolation (infectious) period. That means that you may need to quarantine for 24-days after that person became symptomatic/ill.    If you are vaccinated and do not develop symptoms, you do not need to quarantine after exposure.    If you have symptoms but a negative test, you should stay at home until you are symptom-free and without fever for 24 hours, using the same judgment you would for when it is safe to return to work/school from strep throat, influenza, or the common cold. If you worsen, you should consider being re-evaluated.    If you are being tested for COVID because of symptoms and your test is pending, you should stay home until you know your test result.    If I have COVID, how should I protect myself and others?    Do not go to work or school. Have a friend or relative do your shopping. Do not use public transportation (bus, train) or ridesharing (Lyft, Uber).    Separate yourself from other people in your home. As much as possible, you should stay in one room and away from other people in your home. Also, use a separate bathroom, if possible. Avoid handling pets or other animals while sick.     Wear a facemask if you need to be around other people and cover your mouth and nose with a tissue when  you cough or sneeze.     Avoid sharing personal household items. You should not share dishes, drinking glasses, forks/knives/spoons, towels, or bedding with other people in your home. After using these items, they should be washed with soap and water. Clean parts of your home that are touched often (doorknobs, faucets, countertops, etc.) daily.     Wash your hands often with soap and water for at least 20 seconds or use an alcohol-based hand  containing at least 60% alcohol.     Avoid touching your face.    Treat your symptoms. You can take Acetaminophen (Tylenol) to treat body aches and fever as needed for comfort. Ibuprofen (Advil or Motrin) can be used as well if you still have symptoms after taking Tylenol. Drink fluids. Rest.    Watch for worsening symptoms such as shortness of breath/difficulty breathing or very severe weakness.    Employers/workplaces are being asked by the Centers for Disease Control (CDC) to not request notes/documentation for you to return to work or prove that you were ill. You may choose to show your employer this paperwork. Also, repeat testing should not be required to return to work.    Exercise/Sports in rare cases, COVID could affect your heart in a way that makes exercise or participation in sports dangerous.    If you have a mild COVID illness (fever, cough, sore throat, and similar symptoms but no difficulty breathing or abnormalities of the lung): After your COVID symptoms have resolved, wait 14-days before returning to activity.  If you have more than a mild illness (meaning that you have problems with your breathing or lungs) or if you participate in competitive or strenuous activity or have a history of heart disease: Please see your primary doctor/provider prior to return to activity/competition.    Antibody treatments are available for patients with mild to moderate COVID illness in order to prevent severe illness. In general, only patients with risk factors for  severe illness are eligible for treatment. For more information, to see if you are eligible, and to find treatment, go to the [facility]:  [email or web address].[place]./diseases/coronavirus/mnrap.html     Return to the Emergency Department if:    If you are developing worsening breathing, shortness of breath, or feel worse you should seek medical attention.  If you are uncertain, contact your health care provider/clinic. If you need emergency medical attention, call 911 and tell them you have been ill.    Discharge Instructions  Sore Throat  You were seen today for a sore throat.  Most (>80%) sore throats are caused by a virus. Antibiotics do not help with viral infections, but you can fight off the virus on your own.  In this case, your sore throat would be treated with medications for your pain and fever.    Strep throat is a kind of sore throat caused by Group A streptococcus bacteria.  This type of sore throat is treated with antibiotics.  If you had a rapid test done today for strep throat and it did not show infection, a culture is done in some cases. The culture can take several days to complete. If the culture shows you have strep throat, we will call you and get you a prescription for antibiotics. We will not contact you with a negative culture result.  Generally, every Emergency Department visit should have a follow-up clinic visit with either a primary or a specialty clinic/provider. Please follow-up as instructed by your emergency provider today.  Return to the Emergency Department if:  If you have difficulty breathing.  If you are drooling because you are unable to swallow.  You become dehydrated due to difficulty drinking. Signs of dehydration include weakness, dry mouth, and urinating less than 3 times per day.  If you develop swelling of the neck or tongue.  If you develop a high fever with either severe or unusual headache or stiff neck.    Treatment:    Pain relief --  Non-prescription pain medications, such as Tylenol  (acetaminophen) or Motrin , Advil  (ibuprofen) are usually recommended for pain.  Do not use a medicine that you are allergic to, or if your provider has told you not to use it.  Soft or liquid diet. Concentrate on liquids to keep yourself hydrated. Cold liquids (popsicles, ice cream, etc.) may feel good on your throat.  If you were given a prescription for medicine here today, be sure to read all of the information (including the package insert) that comes with your prescription.  This will include important information about the medicine, its side effects, and any warnings that you need to know about.  The pharmacist who fills the prescription can provide more information and answer questions you may have about the medicine.  If you have questions or concerns that the pharmacist cannot address, please call or return to the Emergency Department.   Remember that you can always come back to the Emergency Department if you are not able to see your regular provider in the amount of time listed above, if you get any new symptoms, or if there is anything that worries you.

## 2021-10-03 NOTE — TELEPHONE ENCOUNTER
"-Coronavirus (COVID-19) Notification    Caller Name (Patient, parent, daughter/son, grandparent, etc)  Patient    Reason for call  Notify of Positive Coronavirus (COVID-19) lab results, assess symptoms,  review  [facility] recommendations    Lab Result    Lab test:  2019-nCoV rRt-PCR or SARS-CoV-2 PCR    Oropharyngeal AND/OR nasopharyngeal swabs is POSITIVE for 2019-nCoV RNA/SARS-COV-2 PCR (COVID-19 virus)    RN Recommendations/Instructions per [facility] Coronavirus COVID-19 recommendations    Brief introduction script  Introduce self then review script:  \"I am calling on behalf of [employer].  We were notified that your Coronavirus test (COVID-19) for was POSITIVE for the virus.  I have some information to relay to you but first I wanted to mention that the [place] Dept of Health will be contacting you shortly [it's possible MD already called Patient] to talk to you more about how you are feeling and other people you have had contact with who might now also have the virus.  Also,  [employer] is Partnering with the [facility] for Covid-19 research, you may be contacted directly by research staff.\"    Assessment (Inquire about Patient's current symptoms)   Assessment   Current Symptoms at time of phone call: (if no symptoms, document No symptoms] Sore throat   Symptoms onset (if applicable) 10/1/21     If at time of call, Patients symptoms hare worsened, the Patient should contact 911 or have someone drive them to Emergency Dept promptly:      If Patient calling 911, inform 911 personal that you have tested positive for the Coronavirus (COVID-19).  Place mask on and await 911 to arrive.    If Emergency Dept, If possible, please have another adult drive you to the Emergency Dept but you need to wear mask when in contact with other people.      Monoclonal Antibody Administration    You may be eligible to receive a new treatment with a monoclonal antibody for preventing hospitalization in " "patients at high risk for complications from COVID-19.   This medication is still experimental and available on a limited basis; it is given through an IV and must be given at an infusion center. Please note that not all people who are eligible will receive the medication since it is in limited supply.     Are you interested in being considered for this medication?  No.   Does the patient fit the criteria: Patient declined    If patient qualifies based on above criteria:  \"You will be contacted if you are selected to receive this treatment in the next 1-2 business days.   This is time sensitive and if you are not selected in the next 1-2 business days, you will not receive the medication.  If you do not receive a call to schedule, you have not been selected.\"      Review information with Patient    Your result was positive. This means you have COVID-19 (coronavirus).  We have sent you a letter that reviews the information that I'll be reviewing with you now.    How can I protect others?    If you have symptoms: stay home and away from others (self-isolate) until:    You've had no fever--and no medicine that reduces fever--for 1 full day (24 hours). And       Your other symptoms have gotten better. For example, your cough or breathing has improved. And     At least 10 days have passed since your symptoms started. (If you've been told by a doctor that you have a weak immune system, wait 20 days.)     If you don't have symptoms: Stay home and away from others (self-isolate) until at least 10 days have passed since your first positive COVID-19 test. (Date test collected)    During this time:    Stay in your own room, including for meals. Use your own bathroom if you can.    Stay away from others in your home. No hugging, kissing or shaking hands. No visitors.     Don't go to work, school or anywhere else.     Clean  high touch  surfaces often (doorknobs, counters, handles, etc.). Use a household cleaning spray or wipes. " You'll find a full list on the EPA website at www.epa.gov/pesticide-registration/list-n-disinfectants-use-against-sars-cov-2.     Cover your mouth and nose with a mask, tissue or other face covering to avoid spreading germs.    Wash your hands and face often with soap and water.    Make a list of people you have been in close contact with recently, even if either of you wore a face covering.   ; Start your list from 2 days before you became ill or had a positive test.  ; Include anyone that was within 6 feet of you for a cumulative total of 15 minutes or more in 24 hours. (Example: if you sat next to Guillermo for 5 minutes in the morning and 10 minutes in the afternoon, then you were in close contact for 15 minutes total that day. Guillermo would be added to your list.)    A public health worker will call or text you. It is important that you answer. They will ask you questions about possible exposures to COVID-19, such as people you have been in direct contact with and places you have visited.    Tell the people on your list that you have COVID-19; they should stay away from others for 14 days starting from the last time they were in contact with you (unless you are told something different from a public health worker).     Caregivers in these groups are at risk for severe illness due to COVID-19:  o People 65 years and older  o People who live in a nursing home or long-term care facility  o People with chronic disease (lung, heart, cancer, diabetes, kidney, liver, immunologic)  o People who have a weakened immune system, including those who:  - Are in cancer treatment  - Take medicine that weakens the immune system, such as corticosteroids  - Had a bone marrow or organ transplant  - Have an immune deficiency  - Have poorly controlled HIV or AIDS  - Are obese (body mass index of 40 or higher)  - Smoke regularly    Caregivers should wear gloves while washing dishes, handling laundry and cleaning bedrooms and  bathrooms.    Wash and dry laundry with special caution. Don't shake dirty laundry, and use the warmest water setting you can.    If you have a weakened immune system, ask your doctor about other actions you should take.    For more tips, go to www.cdc.gov/coronavirus/2019-ncov/downloads/10Things.pdf.    You should not go back to work until you meet the guidelines above for ending your home isolation. You don't need to be retested for COVID-19 before going back to work--studies show that you won't spread the virus if it's been at least 10 days since your symptoms started (or 20 days, if you have a weak immune system).    Employers: This document serves as formal notice of your employee's medical guidelines for going back to work. They must meet the above guidelines before going back to work in person.    How can I take care of myself?    1. Get lots of rest. Drink extra fluids (unless a doctor has told you not to).    2. Take Tylenol (acetaminophen) for fever or pain. If you have liver or kidney problems, ask your family doctor if it's okay to take Tylenol.     Take either:     650 mg (two 325 mg pills) every 4 to 6 hours, or     1,000 mg (two 500 mg pills) every 8 hours as needed.     Note: Don't take more than 3,000 mg in one day. Acetaminophen is found in many medicines (both prescribed and over-the-counter medicines). Read all labels to be sure you don't take too much.    For children, check the Tylenol bottle for the right dose (based on their age or weight).    3. If you have other health problems (like cancer, heart failure, an organ transplant or severe kidney disease): Call your specialty clinic if you don't feel better in the next 2 days.    4. Know when to call 911: Emergency warning signs include:    Trouble breathing or shortness of breath    Pain or pressure in the chest that doesn't go away    Feeling confused like you haven't felt before, or not being able to wake up    Bluish-colored lips or  face    5. Sign up for [employer]. We know it's scary to hear that you have COVID-19. We want to track your symptoms to make sure you're okay over the next 2 weeks. Please look for an email from [employer]--this is a free, online program that we'll use to keep in touch. To sign up, follow the link in the email. Learn more at [email or web address].    Where can I get more information?    [facility]: [email or web address]    Coronavirus Basics: www.health.[facility].[place]./diseases/coronavirus/basics.html    What to Do If You're Sick: www.cdc.gov/coronavirus/2019-ncov/about/steps-when-sick.html    Ending Home Isolation: www.cdc.gov/coronavirus/2019-ncov/hcp/disposition-in-home-patients.html     Caring for Someone with COVID-19: www.cdc.gov/coronavirus/2019-ncov/if-you-are-sick/care-for-someone.html     [facility] clinical trials (COVID-19 research studies): clinicalaffairs.[facility]-clinical-trials     A Positive COVID-19 letter will be sent via [employer] or the mail. (Exception, no letters sent to Presurgerical/Preprocedure Patients)    Gia Hernandez LPN

## 2021-10-03 NOTE — RESULT ENCOUNTER NOTE
Group A Streptococcus PCR is NEGATIVE  No treatment or change in treatment [facility] ED lab result Strep Group A protocol.

## 2021-10-03 NOTE — ED TRIAGE NOTES
Here for sore throat and fever started yesterday associated headache. Took tylenol yesterday at 6pm. Temp of 102.2F prior to arrival. ABCs intact.

## 2022-06-17 ENCOUNTER — OFFICE VISIT (OUTPATIENT)
Dept: OBGYN | Facility: CLINIC | Age: 32
End: 2022-06-17
Payer: COMMERCIAL

## 2022-06-17 VITALS
SYSTOLIC BLOOD PRESSURE: 120 MMHG | DIASTOLIC BLOOD PRESSURE: 78 MMHG | BODY MASS INDEX: 33.77 KG/M2 | HEIGHT: 69 IN | HEART RATE: 70 BPM | WEIGHT: 228 LBS

## 2022-06-17 DIAGNOSIS — Z01.419 WOMEN'S ANNUAL ROUTINE GYNECOLOGICAL EXAMINATION: Primary | ICD-10-CM

## 2022-06-17 PROBLEM — Z36.89 ENCOUNTER FOR TRIAGE IN PREGNANT PATIENT: Status: RESOLVED | Noted: 2021-03-02 | Resolved: 2022-06-17

## 2022-06-17 PROCEDURE — 99395 PREV VISIT EST AGE 18-39: CPT | Performed by: OBSTETRICS & GYNECOLOGY

## 2022-06-17 NOTE — NURSING NOTE
"Chief Complaint   Patient presents with     Physical       Initial /78   Pulse 70   Ht 1.753 m (5' 9\")   Wt 103.4 kg (228 lb)   LMP 2022   Breastfeeding Yes   BMI 33.67 kg/m   Estimated body mass index is 33.67 kg/m  as calculated from the following:    Height as of this encounter: 1.753 m (5' 9\").    Weight as of this encounter: 103.4 kg (228 lb).  BP completed using cuff size: regular    Questioned patient about current smoking habits.  Pt. has never smoked.          The following HM Due: NONE      The following patient reported/Care Every where data was sent to:  P ABSTRACT QUALITY INITIATIVES [[id number]]  Lisa Cotton LPN               "

## 2022-06-17 NOTE — PROGRESS NOTES
SUBJECTIVE:                                                      Zi is a 31 year old  female who presents for annual exam.     No LMP recorded.. Menses are regular q 28-30 days and normal lasting 4 days.  Using condoms for contraception.  She is not currently considering pregnancy.  Besides routine health maintenance, she has no other health concerns today .  GYNECOLOGIC HISTORY:    Zi is sexually active with 1 male partner(s) and is currently in a monogamous relationship.      History sexually transmitted infections:No STD history    History of abnormal Pap smear: NO - age 30-65 PAP every 5 years with negative HPV co-testing recommended  Family history of breast CA: No  Family history of uterine/ovarian CA: No    Family history of colon CA: No      HISTORY:  OB History    Para Term  AB Living   2 2 2 0 0 3   SAB IAB Ectopic Multiple Live Births   0 0 0 1 3      # Outcome Date GA Lbr Luis Antonio/2nd Weight Sex Delivery Anes PTL Lv   2A Term 21 37w0d 00:50 / 00:13 2.97 kg (6 lb 8.8 oz) F Vag-Spont EPI N BRYANNA      Name: CORBIN GOODRICH-A ZI      Apgar1: 9  Apgar5: 9   2B Term 21 37w0d 00:50 / 00:23 3.46 kg (7 lb 10.1 oz) F Vag-Spont EPI N BRYANNA      Name: CORBIN GOODRICH-B ZI      Apgar1: 9  Apgar5: 9   1 Term 10/04/15 37w0d  3.629 kg (8 lb) M  EPI N BRYANNA      Name: Elfego     Past Medical History:   Diagnosis Date     No pertinent past medical history      Past Surgical History:   Procedure Laterality Date     NO HISTORY OF SURGERY       Family History   Problem Relation Age of Onset     Hypertension Mother      Asthma Father      No Known Problems Sister      No Known Problems Brother      Diabetes Type 1 Maternal Grandmother      Bone Cancer Maternal Grandfather      Cerebrovascular Disease Paternal Grandmother      No Known Problems Sister      No Known Problems Sister      Hypertension Sister      No Known Problems Brother      Social History     Socioeconomic History     Marital  status:      Spouse name: Eriberto     Number of children: 1   Occupational History     Occupation: self employed   Tobacco Use     Smoking status: Never Smoker     Smokeless tobacco: Never Used   Substance and Sexual Activity     Alcohol use: No     Comment: rare     Drug use: No     Sexual activity: Yes     Partners: Male       Current Outpatient Medications:      aspirin (ASA) 81 MG chewable tablet, Take 81 mg by mouth daily, Disp: , Rfl:      ibuprofen (ADVIL/MOTRIN) 800 MG tablet, Take 1 tablet (800 mg) by mouth every 8 hours as needed (Patient not taking: Reported on 5/18/2021), Disp: 60 tablet, Rfl: 1     Misc. Devices (BREAST PUMP) MISC, 1 each as needed, Disp: 1 each, Rfl: 0     Prenatal Vit-Fe Fumarate-FA (PRENATAL VITAMIN) 27-0.8 MG TABS, , Disp: , Rfl:    No Known Allergies    Past medical, surgical, social and family history were reviewed and updated in EPIC.    ROS:   Negative other than as noted above.      OBJECTIVE:                                                      EXAM:  There were no vitals taken for this visit.   BMI: There is no height or weight on file to calculate BMI.  General: Alert and oriented, no distress.  Psychiatric: Mood and affect within normal limits.  Skin: Warm and dry, no lesions, rashes or discolorations.  Neck: Neck supple. Thyroid palpbably normal in size and without nodularity.  Cardiovascular: Regular rate and rhythm, no murmurs, rubs or gallops.   Lungs:  Clear to auscultation bilaterally, breathing is unlabored.  Breasts:  Symmetric, no skin changes.  No dominant masses bilaterally.   Lymph:  No cervical, supraclavicular, infraclavicular, axillary or inguinal lymphadenopathy palpable.   Abdomen: Soft, nontender, no hepatosplenomegaly, no rebound or guarding, no masses, no hernias.   Vulva:  No external lesions, normal female hair distribution, no inguinal adenopathy.    Urethra:  Midline, non-tender, well supported, no discharge  Vagina:  Well-estrogenized, no  abnormal discharge, no lesions  Cervix: nontender  Uterus:  anteverted, smooth contour, without enlargement, mobile, and without tenderness  Ovaries:  No masses appreciated, non-tender, mobile  Rectal Exam: deferred  Musculoskeletal: extremities normal      COUNSELING:   Reviewed preventive health counseling, as reflected in patient instructions       Regular exercise       Healthy diet/nutrition       Contraception   reports that she has never smoked. She has never used smokeless tobacco.        ASSESSMENT/PLAN:                                                      31 year old female with satisfactory annual exam  (Z01.419) Women's annual routine gynecological examination  (primary encounter diagnosis)  Comment:   Plan: Lipid panel reflex to direct LDL Fasting,         Glucose        Discussed fasting labs. Paps up to date.  Discussed options for birth control. She is most interested in Mirena IUD, will call to schedule.  Would like to have more children at some point, but not in the next few years most likely.  Follow up as needed or in 1 year.      Melany Albright MD

## 2022-07-05 ENCOUNTER — HOSPITAL ENCOUNTER (EMERGENCY)
Facility: CLINIC | Age: 32
Discharge: HOME | End: 2022-07-05
Attending: EMERGENCY MEDICINE | Admitting: EMERGENCY MEDICINE
Payer: COMMERCIAL

## 2022-07-05 ENCOUNTER — APPOINTMENT (OUTPATIENT)
Dept: ULTRASOUND IMAGING | Facility: CLINIC | Age: 32
End: 2022-07-05
Attending: EMERGENCY MEDICINE
Payer: COMMERCIAL

## 2022-07-05 VITALS
TEMPERATURE: 97.9 F | RESPIRATION RATE: 18 BRPM | HEIGHT: 69 IN | HEART RATE: 92 BPM | BODY MASS INDEX: 33.33 KG/M2 | DIASTOLIC BLOOD PRESSURE: 67 MMHG | WEIGHT: 225 LBS | SYSTOLIC BLOOD PRESSURE: 114 MMHG | OXYGEN SATURATION: 98 %

## 2022-07-05 DIAGNOSIS — R10.13 EPIGASTRIC PAIN: ICD-10-CM

## 2022-07-05 DIAGNOSIS — R11.2 NON-INTRACTABLE VOMITING WITH NAUSEA, UNSPECIFIED VOMITING TYPE: ICD-10-CM

## 2022-07-05 DIAGNOSIS — K76.0 FATTY INFILTRATION OF LIVER: ICD-10-CM

## 2022-07-05 LAB
ALBUMIN SERPL-MCNC: 4 G/DL (ref 3.4–5)
ALP SERPL-CCNC: 86 U/L (ref 40–150)
ALT SERPL W P-5'-P-CCNC: 20 U/L (ref 0–50)
ANION GAP SERPL CALCULATED.3IONS-SCNC: 3 MMOL/L (ref 3–14)
AST SERPL W P-5'-P-CCNC: 15 U/L (ref 0–45)
BASOPHILS # BLD AUTO: 0 10E3/UL (ref 0–0.2)
BASOPHILS NFR BLD AUTO: 0 %
BILIRUB SERPL-MCNC: 0.8 MG/DL (ref 0.2–1.3)
BUN SERPL-MCNC: 15 MG/DL (ref 7–30)
CALCIUM SERPL-MCNC: 9.1 MG/DL (ref 8.5–10.1)
CHLORIDE BLD-SCNC: 106 MMOL/L (ref 94–109)
CO2 SERPL-SCNC: 29 MMOL/L (ref 20–32)
CREAT SERPL-MCNC: 0.88 MG/DL (ref 0.52–1.04)
EOSINOPHIL # BLD AUTO: 0 10E3/UL (ref 0–0.7)
EOSINOPHIL NFR BLD AUTO: 1 %
ERYTHROCYTE [DISTWIDTH] IN BLOOD BY AUTOMATED COUNT: 11.9 % (ref 10–15)
GFR SERPL CREATININE-BSD FRML MDRD: >90 ML/MIN/1.73M2
GLUCOSE BLD-MCNC: 94 MG/DL (ref 70–99)
HCG SERPL QL: NEGATIVE
HCT VFR BLD AUTO: 39.9 % (ref 35–47)
HGB BLD-MCNC: 13.2 G/DL (ref 11.7–15.7)
IMM GRANULOCYTES # BLD: 0 10E3/UL
IMM GRANULOCYTES NFR BLD: 0 %
LIPASE SERPL-CCNC: 87 U/L (ref 73–393)
LYMPHOCYTES # BLD AUTO: 0.8 10E3/UL (ref 0.8–5.3)
LYMPHOCYTES NFR BLD AUTO: 13 %
MCH RBC QN AUTO: 30.2 PG (ref 26.5–33)
MCHC RBC AUTO-ENTMCNC: 33.1 G/DL (ref 31.5–36.5)
MCV RBC AUTO: 91 FL (ref 78–100)
MONOCYTES # BLD AUTO: 0.4 10E3/UL (ref 0–1.3)
MONOCYTES NFR BLD AUTO: 6 %
NEUTROPHILS # BLD AUTO: 4.9 10E3/UL (ref 1.6–8.3)
NEUTROPHILS NFR BLD AUTO: 80 %
NRBC # BLD AUTO: 0 10E3/UL
NRBC BLD AUTO-RTO: 0 /100
PLATELET # BLD AUTO: 270 10E3/UL (ref 150–450)
POTASSIUM BLD-SCNC: 3.9 MMOL/L (ref 3.4–5.3)
PROT SERPL-MCNC: 7.7 G/DL (ref 6.8–8.8)
RBC # BLD AUTO: 4.37 10E6/UL (ref 3.8–5.2)
SODIUM SERPL-SCNC: 138 MMOL/L (ref 133–144)
WBC # BLD AUTO: 6.2 10E3/UL (ref 4–11)

## 2022-07-05 PROCEDURE — 84703 CHORIONIC GONADOTROPIN ASSAY: CPT | Performed by: EMERGENCY MEDICINE

## 2022-07-05 PROCEDURE — 83690 ASSAY OF LIPASE: CPT | Performed by: EMERGENCY MEDICINE

## 2022-07-05 PROCEDURE — 250N000013 HC RX MED GY IP 250 OP 250 PS 637: Performed by: EMERGENCY MEDICINE

## 2022-07-05 PROCEDURE — 96361 HYDRATE IV INFUSION ADD-ON: CPT

## 2022-07-05 PROCEDURE — 85025 COMPLETE CBC W/AUTO DIFF WBC: CPT | Performed by: EMERGENCY MEDICINE

## 2022-07-05 PROCEDURE — 36415 COLL VENOUS BLD VENIPUNCTURE: CPT | Performed by: EMERGENCY MEDICINE

## 2022-07-05 PROCEDURE — 258N000003 HC RX IP 258 OP 636: Performed by: EMERGENCY MEDICINE

## 2022-07-05 PROCEDURE — 250N000011 HC RX IP 250 OP 636: Performed by: EMERGENCY MEDICINE

## 2022-07-05 PROCEDURE — 99284 EMERGENCY DEPT VISIT MOD MDM: CPT | Mod: 25

## 2022-07-05 PROCEDURE — 76705 ECHO EXAM OF ABDOMEN: CPT

## 2022-07-05 PROCEDURE — 96374 THER/PROPH/DIAG INJ IV PUSH: CPT

## 2022-07-05 PROCEDURE — 80053 COMPREHEN METABOLIC PANEL: CPT | Performed by: EMERGENCY MEDICINE

## 2022-07-05 RX ORDER — ONDANSETRON 4 MG/1
4 TABLET, ORALLY DISINTEGRATING ORAL EVERY 8 HOURS PRN
Qty: 10 TABLET | Refills: 0 | Status: SHIPPED | OUTPATIENT
Start: 2022-07-05 | End: 2022-07-08

## 2022-07-05 RX ORDER — ONDANSETRON 2 MG/ML
4 INJECTION INTRAMUSCULAR; INTRAVENOUS EVERY 30 MIN PRN
Status: DISCONTINUED | OUTPATIENT
Start: 2022-07-05 | End: 2022-07-05 | Stop reason: HOSPADM

## 2022-07-05 RX ORDER — ACETAMINOPHEN 325 MG/1
975 TABLET ORAL ONCE
Status: COMPLETED | OUTPATIENT
Start: 2022-07-05 | End: 2022-07-05

## 2022-07-05 RX ORDER — MAGNESIUM HYDROXIDE/ALUMINUM HYDROXICE/SIMETHICONE 120; 1200; 1200 MG/30ML; MG/30ML; MG/30ML
30 SUSPENSION ORAL ONCE
Status: COMPLETED | OUTPATIENT
Start: 2022-07-05 | End: 2022-07-05

## 2022-07-05 RX ORDER — SODIUM CHLORIDE 9 MG/ML
INJECTION, SOLUTION INTRAVENOUS CONTINUOUS
Status: DISCONTINUED | OUTPATIENT
Start: 2022-07-05 | End: 2022-07-05 | Stop reason: HOSPADM

## 2022-07-05 RX ADMIN — ACETAMINOPHEN 975 MG: 325 TABLET ORAL at 10:42

## 2022-07-05 RX ADMIN — ONDANSETRON 4 MG: 2 INJECTION INTRAMUSCULAR; INTRAVENOUS at 10:02

## 2022-07-05 RX ADMIN — SODIUM CHLORIDE 1000 ML: 9 INJECTION, SOLUTION INTRAVENOUS at 09:51

## 2022-07-05 RX ADMIN — ALUMINUM HYDROXIDE, MAGNESIUM HYDROXIDE, AND SIMETHICONE 30 ML: 200; 200; 20 SUSPENSION ORAL at 10:43

## 2022-07-05 ASSESSMENT — ENCOUNTER SYMPTOMS
VOMITING: 1
BLOOD IN STOOL: 0
FREQUENCY: 0
DIARRHEA: 0
COUGH: 0
HEMATURIA: 0
ABDOMINAL PAIN: 1
FEVER: 0
SHORTNESS OF BREATH: 0
DYSURIA: 0
HEADACHES: 1
NAUSEA: 1

## 2022-07-05 NOTE — ED TRIAGE NOTES
Triage Assessment     Row Name 07/05/22 0834       Triage Assessment (Adult)    Airway WDL WDL       Respiratory WDL    Respiratory WDL WDL       Skin Circulation/Temperature WDL    Skin Circulation/Temperature WDL WDL       Cardiac WDL    Cardiac WDL WDL       Cognitive/Neuro/Behavioral WDL    Cognitive/Neuro/Behavioral WDL WDL

## 2022-07-05 NOTE — DISCHARGE INSTRUCTIONS
Discharge Instructions  Abdominal Pain    Abdominal pain (belly pain) can be caused by many things. Your evaluation today does not show the exact cause for your pain. Your provider today has decided that it is unlikely your pain is due to a life threatening problem, or a problem requiring surgery or hospital admission. Sometimes those problems cannot be found right away, so it is very important that you follow up as directed.  Sometimes only the changes which occur over time allow the cause of your pain to be found.    Generally, every Emergency Department visit should have a follow-up clinic visit with either a primary or a specialty clinic/provider. Please follow-up as instructed by your emergency provider today. With abdominal pain, we often recommend very close follow-up, such as the following day.    ADULTS:  Return to the Emergency Department right away if:    You get an oral temperature above 102oF or as directed by your provider.  You have blood in your stools. This may be bright red or appear as black, tarry stools.    You keep vomiting (throwing up) or cannot drink liquids.  You see blood when you vomit.   You cannot have a bowel movement or you cannot pass gas.  Your stomach gets bloated or bigger.  Your skin or the whites of your eyes look yellow.  You faint.  You have bloody, frequent or painful urination (peeing).  You have new symptoms or anything that worries you.    CHILDREN:  Return to the Emergency Department right away if your child has any of the above-listed symptoms or the following:    Pushes your hand away or screams/cries when his/her belly is touched.  You notice your child is very fussy or weak.  Your child is very tired and is too tired to eat or drink.  Your child is dehydrated.  Signs of dehydration can be:  Significant change in the amount of wet diapers/urine.  Your infant or child starts to have dry mouth and lips, or no saliva (spit) or tears.    PREGNANT WOMEN:  Return to the  Emergency Department right away if you have any of the above-listed symptoms or the following:    You have bleeding, leaking fluid or passing tissue from the vagina.  You have worse pain or cramping, or pain in your shoulder or back.  You have vomiting that will not stop.  You have a temperature of 100oF or more.  Your baby is not moving as much as usual.  You faint.  You get a bad headache with or without eye problems and abdominal pain.  You have a seizure.  You have unusual discharge from your vagina and abdominal pain.    Abdominal pain is pretty common during pregnancy.  Your pain may or may not be related to your pregnancy. You should follow-up closely with your OB provider so they can evaluate you and your baby.  Until you follow-up with your regular provider, do the following:     Avoid sex and do not put anything in your vagina.  Drink clear fluids.  Only take medications approved by your provider.    MORE INFORMATION:    Appendicitis:  A possible cause of abdominal pain in any person who still has their appendix is acute appendicitis. Appendicitis is often hard to diagnose.  Testing does not always rule out early appendicitis or other causes of abdominal pain. Close follow-up with your provider and re-evaluations may be needed to figure out the reason for your abdominal pain.    Follow-up:  It is very important that you make an appointment with your clinic and go to the appointment.  If you do not follow-up with your primary provider, it may result in missing an important development which could result in permanent injury or disability and/or lasting pain.  If there is any problem keeping your appointment, call your provider or return to the Emergency Department.    Medications:  Take your medications as directed by your provider today.  Before using over-the-counter medications, ask your provider and make sure to take the medications as directed.  If you have any questions about medications, ask your  "provider.    Diet:  Resume your normal diet as much as possible, but do not eat fried, fatty or spicy foods while you have pain.  Do not drink alcohol or have caffeine.  Do not smoke tobacco.    Probiotics: If you have been given an antibiotic, you may want to also take a probiotic pill or eat yogurt with live cultures. Probiotics have \"good bacteria\" to help your intestines stay healthy. Studies have shown that probiotics help prevent diarrhea (loose stools) and other intestine problems (including C. diff infection) when you take antibiotics. You can buy these without a prescription in the pharmacy section of the store.     If you were given a prescription for medicine here today, be sure to read all of the information (including the package insert) that comes with your prescription.  This will include important information about the medicine, its side effects, and any warnings that you need to know about.  The pharmacist who fills the prescription can provide more information and answer questions you may have about the medicine.  If you have questions or concerns that the pharmacist cannot address, please call or return to the Emergency Department.       Remember that you can always come back to the Emergency Department if you are not able to see your regular provider in the amount of time listed above, if you get any new symptoms, or if there is anything that worries you.     Discharge Instructions  Incidental Findings: Fatty infiltration of liver    An incidental finding is something unexpected that was found while you were being treated and is felt to not be related to the reason that you came to the Emergency Department.  While this finding is not an emergency, you need to follow up with your primary provider (or occasionally a specialist) to determine if anything should be done about it.    These findings can come from:  Checking your vital signs (example: high blood pressure).  Taking your history (example: " unexplained weight loss).  The physical exam (example: a heart murmur).  Laboratory study (example: anemia or low blood count).  X-rays/ultrasound/CT or other imaging (example: an unexplained mass).    Generally, every Emergency Department visit should have a follow-up clinic visit with either a primary or a specialty clinic/provider. Please follow-up as instructed by your emergency provider today.    Return to the Emergency Department if:  Your condition worsens.  You develop unexpected pain.  You now develop new symptoms or have new concerns.  If you were given a prescription for medicine here today, be sure to read all of the information (including the package insert) that comes with your prescription.  This will include important information about the medicine, its side effects, and any warnings that you need to know about.  The pharmacist who fills the prescription can provide more information and answer questions you may have about the medicine.  If you have questions or concerns that the pharmacist cannot address, please call or return to the Emergency Department.   Remember that you can always come back to the Emergency Department if you are not able to see your regular provider in the amount of time listed above, if you get any new symptoms, or if there is anything that worries you.

## 2022-07-05 NOTE — ED PROVIDER NOTES
"  History   Chief Complaint:  Abdominal Pain     HPI   Zi Mirza is a 31 year old female who presents for evaluation of abdominal pain. This morning around 0200 the patient awoke with new nausea, vomiting, and upper abdominal pain. Since then she has developed a slight headache and she has had some intermittent tingling in her bilateral hands. Her abdominal pain has been waxing and waning since onset. Due to her ongoing symptoms throughout the morning she came into the ED for evaluation. Here in the ED she rates her pain at a severity of 5/10. She notes that her last normal bowel movement was four days ago, and she typically has bowel movements every three to four days. She has not had any thing to eat or drink this morning. Her last menstrual period was last week. She has not had any fever, cough, shortness of breath, chest pain, diarrhea, bloody stools, dysuria, hematuria, urinary frequency, or vaginal discharge. She does not believe that she is pregnant.      Review of Systems   Constitutional: Negative for fever.   Respiratory: Negative for cough and shortness of breath.    Cardiovascular: Negative for chest pain.   Gastrointestinal: Positive for abdominal pain, nausea and vomiting. Negative for blood in stool and diarrhea.   Genitourinary: Negative for dysuria, frequency, hematuria and vaginal discharge.   Neurological: Positive for headaches.   All other systems reviewed and are negative.      Allergies:  No known drug allergies      Medications:  Aspirin     Past Medical History:     Allergy-induced asthma      Family History:    Hypertension - Mother and sister   Asthma - Father     Social History:  The patient presents to the ED accompanied by her .   Marital status:      Physical Exam     Patient Vitals for the past 24 hrs:   BP Temp Temp src Pulse Resp SpO2 Height Weight   07/05/22 0831 118/70 97.9  F (36.6  C) Temporal 95 18 99 % 1.753 m (5' 9\") 102.1 kg (225 lb)       Physical " Exam  Eyes:  The pupils are equal and round    Conjunctivae and sclerae are normal  ENT:    The nose is normal    Pinnae are normal  CV:  Regular rate and rhythm     No edema  Resp:  Lungs are clear    Non-labored    No rales    No wheezing   GI:  Abdomen is soft with epigastric and ruq tenderness. Negative Alviso, there is no rigidity  MS:  Normal muscular tone    No asymmetric leg swelling  Skin:  No rash or acute skin lesions noted  Neuro:   Awake, alert.      Speech is normal and fluent.    Face is symmetric.     Moves all extremities    Emergency Department Course     Imaging:  US Abdomen Limited (RUQ)   Preliminary Result   IMPRESSION:   1.  No acute abnormality.   2.  Fatty infiltration of the liver. Hepatomegaly.      Report per radiology    Laboratory:  Labs Ordered and Resulted from Time of ED Arrival to Time of ED Departure   COMPREHENSIVE METABOLIC PANEL - Normal       Result Value    Sodium 138      Potassium 3.9      Chloride 106      Carbon Dioxide (CO2) 29      Anion Gap 3      Urea Nitrogen 15      Creatinine 0.88      Calcium 9.1      Glucose 94      Alkaline Phosphatase 86      AST 15      ALT 20      Protein Total 7.7      Albumin 4.0      Bilirubin Total 0.8      GFR Estimate >90     LIPASE - Normal    Lipase 87     HCG QUALITATIVE PREGNANCY - Normal    hCG Serum Qualitative Negative     CBC WITH PLATELETS AND DIFFERENTIAL    WBC Count 6.2      RBC Count 4.37      Hemoglobin 13.2      Hematocrit 39.9      MCV 91      MCH 30.2      MCHC 33.1      RDW 11.9      Platelet Count 270      % Neutrophils 80      % Lymphocytes 13      % Monocytes 6      % Eosinophils 1      % Basophils 0      % Immature Granulocytes 0      NRBCs per 100 WBC 0      Absolute Neutrophils 4.9      Absolute Lymphocytes 0.8      Absolute Monocytes 0.4      Absolute Eosinophils 0.0      Absolute Basophils 0.0      Absolute Immature Granulocytes 0.0      Absolute NRBCs 0.0     ROUTINE UA WITH MICROSCOPIC REFLEX TO CULTURE         Emergency Department Course:     Reviewed:  I reviewed nursing notes, vitals and past medical history    Assessments:  0938:  I obtained history and examined the patient as noted above.     1120: I updated and reassessed the patient.      Interventions:  0951 NS 1,000 mL IV   1002 Zofran 4 mg IV   1042 Tylenol 975 mg PO   1043 Maalox 30 mL PO     Disposition:  The patient was discharged to home.     Impression & Plan       Medical Decision Making:  Zi Mirza is a 31 year old female who is otherwise healthy and presents to the emergency department with upper abdominal pain and vomiting that started this morning at 0200. Exam reveals tenderness in the right upper quadrant and epigastric area. Laboratory workup overall is reassuring with normal hemoglobin, electrolytes, liver enzymes, lipase, pregnancy test, and white blood cell count. Ultrasound obtained of the right upper quadrant and does not show any signs of cholecystitis. I did review the fatty infiltration of the liver that was noted. Recommended follow up with primary care provider. Here she noted improvement of her nausea with Zofran and pain improved with a dose of Maalox. Recommended follow up with PCP regarding the fatty infiltration of the liver and Zofran for nausea and Omeprazole for epigastric discomfort. Discussed warning signs and return precautions.      Diagnosis:    ICD-10-CM    1. Epigastric pain  R10.13    2. Non-intractable vomiting with nausea, unspecified vomiting type  R11.2    3. Fatty infiltration of liver  K76.0        Discharge Medications:  New Prescriptions    OMEPRAZOLE (PRILOSEC) 20 MG DR CAPSULE    Take 1 capsule (20 mg) by mouth daily for 14 days    ONDANSETRON (ZOFRAN ODT) 4 MG ODT TAB    Take 1 tablet (4 mg) by mouth every 8 hours as needed for nausea       Scribe Disclosure:  Dinh FOY, am serving as a scribe at 9:22 AM on 7/5/2022 to document services personally performed by Erik Nicholas MD based on my  observations and the provider's statements to me.         Erik Nicholas MD  07/05/22 1964

## 2022-07-15 ENCOUNTER — OFFICE VISIT (OUTPATIENT)
Dept: OBGYN | Facility: CLINIC | Age: 32
End: 2022-07-15
Payer: COMMERCIAL

## 2022-07-15 VITALS — DIASTOLIC BLOOD PRESSURE: 64 MMHG | BODY MASS INDEX: 35.15 KG/M2 | WEIGHT: 238 LBS | SYSTOLIC BLOOD PRESSURE: 102 MMHG

## 2022-07-15 DIAGNOSIS — Z01.812 PRE-PROCEDURE LAB EXAM: Primary | ICD-10-CM

## 2022-07-15 DIAGNOSIS — Z30.430 ENCOUNTER FOR INSERTION OF INTRAUTERINE CONTRACEPTIVE DEVICE: ICD-10-CM

## 2022-07-15 LAB — HCG IFA URINE: NEGATIVE

## 2022-07-15 PROCEDURE — 84703 CHORIONIC GONADOTROPIN ASSAY: CPT | Performed by: OBSTETRICS & GYNECOLOGY

## 2022-07-15 PROCEDURE — 58300 INSERT INTRAUTERINE DEVICE: CPT | Performed by: OBSTETRICS & GYNECOLOGY

## 2022-07-15 RX ORDER — COPPER 313.4 MG/1
1 INTRAUTERINE DEVICE INTRAUTERINE ONCE
Status: COMPLETED
Start: 2022-07-15 | End: 2022-07-15

## 2022-07-15 RX ORDER — COPPER 313.4 MG/1
1 INTRAUTERINE DEVICE INTRAUTERINE ONCE
COMMUNITY

## 2022-07-15 RX ADMIN — COPPER 1 EACH: 313.4 INTRAUTERINE DEVICE INTRAUTERINE at 13:54

## 2022-07-15 NOTE — PROGRESS NOTES
INDICATIONS:                                                      Zi Mirza is a 31 year old female,   who presents for insertion of an IUD. The risks, benefits and alternatives of IUD insertion were discussed in detail. She has elected to go ahead with the insertion today and her questions were answered. Consent was signed.  A pregnancy was performed today:  Yes.      PROCEDURE:                                                      The pelvic exam revealed normal external genitalia. A speculum was inserted into the vagina and the cervix was visualized. The cervix was prepped with Betadine . The uterus sounded to 7 cm. A Paragard IUD was then inserted without difficulty. The string was cut to 3 cm.  The patient experienced a mild amount of cramping.    POST PROCEDURE:                                                      She  tolerated the procedure well. There were no complications. Patient was discharged in stable condition.    Return to clinic in 4-6 weeks for IUD check.  Call if severe cramping, fever, abnormal bleeding, abnormal discharge or pelvic pain develop.  Patient was counseled about the chance of irregular bleeding.    Melany Albright MD

## 2022-07-15 NOTE — NURSING NOTE
"Chief Complaint   Patient presents with     IUD       Initial /64   Wt 108 kg (238 lb)   LMP 2022   Breastfeeding No   BMI 35.15 kg/m   Estimated body mass index is 35.15 kg/m  as calculated from the following:    Height as of 22: 1.753 m (5' 9\").    Weight as of this encounter: 108 kg (238 lb).  BP completed using cuff size: regular    Questioned patient about current smoking habits.  Pt. has never smoked.          The following HM Due: NONE      The following patient reported/Care Every where data was sent to:  P ABSTRACT QUALITY INITIATIVES [[id number]]  Lisa Cotton LPN               "

## 2022-07-21 ENCOUNTER — TELEPHONE (OUTPATIENT)
Dept: OBGYN | Facility: CLINIC | Age: 32
End: 2022-07-21

## 2022-07-21 DIAGNOSIS — Z01.419 WOMEN'S ANNUAL ROUTINE GYNECOLOGICAL EXAMINATION: Primary | ICD-10-CM

## 2022-07-21 DIAGNOSIS — N93.8 DYSFUNCTIONAL UTERINE BLEEDING: ICD-10-CM

## 2022-07-21 RX ORDER — NORGESTIMATE AND ETHINYL ESTRADIOL 0.25-0.035
1 KIT ORAL DAILY
Qty: 84 TABLET | Refills: 3 | Status: SHIPPED | OUTPATIENT
Start: 2022-07-21 | End: 2022-09-06

## 2022-07-21 NOTE — TELEPHONE ENCOUNTER
Which one? Progesterone or oral contraceptive pills?  Melany Albright MD  [facility]

## 2022-07-21 NOTE — TELEPHONE ENCOUNTER
Rx for ortho cyclen sent--this typically works better than progesterone alone.  I would take it 3 times a day for 3 days, 2 times a day for 2 days, then daily.  This should slow the bleeding down within 24-48 hours. It may may her nauseated if she does not take it with food.    Melany Albright MD  [facility]

## 2022-07-21 NOTE — TELEPHONE ENCOUNTER
"Pt calling after IUD was placed on 7/15.  Spotting started about 2 days ago, currently a \"full blown cycle.\" She uses a menstrual cup, today she filled that and also saturated a pad over the past few hours.  First episode of bleeding that heavy.  Denies pain.    Has not felt strings.    I scheduled for pelvic US to check placement-due to her schedule she is not able to do this until next Thursday.    I reviewed to monitor bleeding now, if she continues to saturate a pad/fill a cup every 1-2 hours to call back.  Also to call back if feeling dizzy, lightheaded or nauseous.    Please advise if anything else should be done.    Loida Fisher RN          "

## 2022-07-21 NOTE — TELEPHONE ENCOUNTER
She could consider starting an oral contraceptive pill or progesterone short term until the US to see if that lessens the bleeding.  Melany Albright MD

## 2022-07-23 ENCOUNTER — HOSPITAL ENCOUNTER (EMERGENCY)
Facility: CLINIC | Age: 32
Discharge: HOME | End: 2022-07-24
Attending: EMERGENCY MEDICINE | Admitting: EMERGENCY MEDICINE
Payer: COMMERCIAL

## 2022-07-23 DIAGNOSIS — H65.02 ACUTE SEROUS OTITIS MEDIA OF LEFT EAR, RECURRENCE NOT SPECIFIED: ICD-10-CM

## 2022-07-23 PROCEDURE — 99283 EMERGENCY DEPT VISIT LOW MDM: CPT

## 2022-07-24 VITALS
TEMPERATURE: 97.8 F | RESPIRATION RATE: 16 BRPM | OXYGEN SATURATION: 97 % | HEART RATE: 98 BPM | DIASTOLIC BLOOD PRESSURE: 77 MMHG | SYSTOLIC BLOOD PRESSURE: 127 MMHG

## 2022-07-24 PROCEDURE — 250N000013 HC RX MED GY IP 250 OP 250 PS 637: Performed by: EMERGENCY MEDICINE

## 2022-07-24 RX ORDER — FLUTICASONE PROPIONATE 50 MCG
1 SPRAY, SUSPENSION (ML) NASAL DAILY
Qty: 9.9 ML | Refills: 0 | Status: SHIPPED | OUTPATIENT
Start: 2022-07-24 | End: 2022-08-03

## 2022-07-24 RX ORDER — MECLIZINE HYDROCHLORIDE 25 MG/1
25 TABLET ORAL ONCE
Status: COMPLETED | OUTPATIENT
Start: 2022-07-24 | End: 2022-07-24

## 2022-07-24 RX ORDER — MECLIZINE HYDROCHLORIDE 25 MG/1
25 TABLET ORAL EVERY 6 HOURS PRN
Qty: 20 TABLET | Refills: 0 | Status: SHIPPED | OUTPATIENT
Start: 2022-07-24 | End: 2022-09-06

## 2022-07-24 RX ORDER — GUAIFENESIN, PSEUDOEPHEDRINE HYDROCHLORIDE 600; 60 MG/1; MG/1
1 TABLET, EXTENDED RELEASE ORAL EVERY 12 HOURS
Qty: 20 TABLET | Refills: 0 | Status: SHIPPED | OUTPATIENT
Start: 2022-07-24 | End: 2022-08-03

## 2022-07-24 RX ADMIN — MECLIZINE HYDROCHLORIDE 25 MG: 25 TABLET ORAL at 00:21

## 2022-07-24 ASSESSMENT — ENCOUNTER SYMPTOMS
NAUSEA: 1
DIZZINESS: 1
SORE THROAT: 0

## 2022-07-24 NOTE — ED PROVIDER NOTES
"  History   Chief Complaint:  Ear Fullness       HPI   Zi Mirza is a 31 year old female who presents with her mother for evaluation of ear fullness. The patient reports onset of left ear \"fullness\" over the last few days. She notes this causes her to feel dizzy, sound is muffled and she has some nausea. She has tried sucking something out, pouring water in her ear and qtips. No recent Covid or sore throat. No recent flights. The patient has not used her flonase in a few days. Her right ear feels normal.    Review of Systems   HENT: Negative for sore throat.         Ear fullness   Gastrointestinal: Positive for nausea.   Neurological: Positive for dizziness.   All other systems reviewed and are negative.    Allergies:  The patient has no known allergies.     Medications:  Aspirin 81 mg  IUD device  Ortho-cyclen  Prenatal vitamin    Past Medical History:     Obesity     Family History:    Mother: hypertension  Father: asthma    Social History:  The patient presents to the ED with her mother    Physical Exam     Patient Vitals for the past 24 hrs:   BP Temp Temp src Pulse Resp SpO2   07/23/22 2252 127/77 97.8  F (36.6  C) Oral 98 20 97 %       Physical Exam  General: Patient is alert and interactive when I enter the room  Head:  The scalp, face, and head appear normal  Eyes:  Conjunctivae are normal  ENT:    The nose is normal    Bilateral TMs with fluid collections. No otitis media/externa.   Neck:  Trachea midline   CV:  Normal rate  Resp:  No respiratory distress   Musc:  Normal muscular tone  Skin:  No rash or lesions noted  Neuro: Speech is normal and fluent. Face is symmetric. Moving all extremities well.   Psych:  Awake. Alert.  Normal affect.  Appropriate interactions.      Emergency Department Course   Emergency Department Course:       Reviewed:  I reviewed nursing notes, vitals, past medical history and Care Everywhere    Assessments:  2354 I obtained history and examined the patient as noted above. " Plan explained at this time.    Interventions:  Antivert, 25 mg, PO    Disposition:  The patient was discharged to home.     Impression & Plan   Medical Decision Making:  Patient presents with muffled hearing, dizziness, ear fullness.  On exam, patient has evidence of serous otitis media.  This is most likely etiology for her symptoms.  Doubt bacterial otitis media, and there is no visualized TM perforation, or evidence of mastoiditis.  We will plan on decongestants, Flonase, and Antivert for symptoms.  Recommended ENT follow-up for failure to improve.  Side effects of the medications discussed.  Patient discharged in stable condition.  All questions answered.    Diagnosis:    ICD-10-CM    1. Acute serous otitis media of left ear, recurrence not specified  H65.02        Discharge Medications:  New Prescriptions    FLUTICASONE (FLONASE) 50 MCG/ACT NASAL SPRAY    Spray 1 spray into both nostrils daily for 10 days    MECLIZINE (ANTIVERT) 25 MG TABLET    Take 1 tablet (25 mg) by mouth every 6 hours as needed for dizziness    PSEUDOEPHEDRINE-GUAIFENESIN (MUCINEX D)  MG 12 HR TABLET    Take 1 tablet by mouth every 12 hours for 10 days       Scribe Disclosure:  I, Lennox Yee, am serving as a scribe at 11:51 PM on 7/23/2022 to document services personally performed by Castro Auguste MD based on my observations and the provider's statements to me.            Castro Auguste MD  07/24/22 0414

## 2022-07-24 NOTE — ED TRIAGE NOTES
Pt states left ear fullness that has not improved despite attempting to irrigate ear at home. ABCs intact GCS 15     Triage Assessment     Row Name 07/23/22 [id number]       Triage Assessment (Adult)    Airway WDL WDL       Respiratory WDL    Respiratory WDL WDL       Skin Circulation/Temperature WDL    Skin Circulation/Temperature WDL WDL       Cardiac WDL    Cardiac WDL WDL       Peripheral/Neurovascular WDL    Peripheral Neurovascular WDL WDL       Cognitive/Neuro/Behavioral WDL    Cognitive/Neuro/Behavioral WDL WDL

## 2022-09-06 ENCOUNTER — OFFICE VISIT (OUTPATIENT)
Dept: OBGYN | Facility: CLINIC | Age: 32
End: 2022-09-06
Payer: COMMERCIAL

## 2022-09-06 VITALS
DIASTOLIC BLOOD PRESSURE: 70 MMHG | HEIGHT: 69 IN | SYSTOLIC BLOOD PRESSURE: 120 MMHG | BODY MASS INDEX: 35.25 KG/M2 | WEIGHT: 238 LBS | HEART RATE: 80 BPM

## 2022-09-06 DIAGNOSIS — N92.1 MENORRHAGIA WITH IRREGULAR CYCLE: Primary | ICD-10-CM

## 2022-09-06 PROCEDURE — 99213 OFFICE O/P EST LOW 20 MIN: CPT | Performed by: OBSTETRICS & GYNECOLOGY

## 2022-09-06 NOTE — NURSING NOTE
"Chief Complaint   Patient presents with     Follow Up       Initial /70   Pulse 80   Ht 1.753 m (5' 9\")   Wt 108 kg (238 lb)   LMP 2022 (Exact Date)   Breastfeeding No   BMI 35.15 kg/m   Estimated body mass index is 35.15 kg/m  as calculated from the following:    Height as of this encounter: 1.753 m (5' 9\").    Weight as of this encounter: 108 kg (238 lb).  BP completed using cuff size: regular    Questioned patient about current smoking habits.  Pt. has never smoked.          The following HM Due: NONE      The following patient reported/Care Every where data was sent to:  P ABSTRACT QUALITY INITIATIVES [[id number]]  Lisa Cotton LPN             "

## 2022-09-06 NOTE — PROGRESS NOTES
SUBJECTIVE:                                                   Zi Mirza is a 31 year old female who presents to clinic today to follow up for irregular bleeding with the Paragard IUD. Please see my last note for complete details.    Started oral contraceptive pills, and this got better. Off those now, and no bleeding.  Historically she has not tolerated hormonal contraception well, so she strongly desires not to use it.  We briefly discussed barrier methods of contraception that she could use if she decides to discontinue the use of the ParaGard.  At this point she is just hoping that her body is getting used to this and her periods will return to some semblance of normal.      Problem list and histories reviewed & adjusted, as indicated.  Additional history: as documented.    Patient Active Problem List   Diagnosis   (none) - all problems resolved or deleted     Past Surgical History:   Procedure Laterality Date     NO HISTORY OF SURGERY        Social History     Tobacco Use     Smoking status: Never Smoker     Smokeless tobacco: Never Used   Substance Use Topics     Alcohol use: No     Comment: rare      Problem (# of Occurrences) Relation (Name,Age of Onset)    Asthma (1) Father    Bone Cancer (1) Maternal Grandfather    Cerebrovascular Disease (1) Paternal Grandmother    Diabetes Type 1 (1) Maternal Grandmother    Hypertension (2) Mother, Sister    No Known Problems (5) Sister, Brother, Sister, Sister, Brother            paragard intrauterine copper device, 1 each by Intrauterine route once    No current facility-administered medications on file prior to visit.    No Known Allergies    ROS:  Negative except as noted in HPI.    OBJECTIVE:     No exam was necessary today as this was entirely a counseling appointment.    In-Clinic Test Results:  No results found for this or any previous visit (from the past 24 hour(s)).    ASSESSMENT/PLAN:                                                        ICD-10-CM     1. Menorrhagia with irregular cycle  N92.1          Again, we discussed treatment with oral contraceptives on top of ParaGard IUD, but she does not want to do this as she does not tolerate hormones well overall.  We also discussed removing the ParaGard IUD if abnormal bleeding continues, to see what happens with her natural cycles.  In that event, I would recommend the use of barrier contraception or some other nonhormonal form of contraception if she does not wish to use hormonal contraceptives.  They would like to have several more children, so she is not interested at this point in permanent contraception, but would be interested in that after their family is completed.  She will let me know if her abnormal bleeding resumes.  Otherwise, we will follow-up when scheduled for annual exam.    Melany Albright MD  [facility] [place]

## 2022-11-01 ENCOUNTER — OFFICE VISIT (OUTPATIENT)
Dept: FAMILY MEDICINE | Facility: CLINIC | Age: 32
End: 2022-11-01
Payer: COMMERCIAL

## 2022-11-01 VITALS
RESPIRATION RATE: 12 BRPM | HEART RATE: 98 BPM | SYSTOLIC BLOOD PRESSURE: 112 MMHG | BODY MASS INDEX: 34.51 KG/M2 | HEIGHT: 69 IN | WEIGHT: 233 LBS | TEMPERATURE: 98.2 F | DIASTOLIC BLOOD PRESSURE: 68 MMHG

## 2022-11-01 DIAGNOSIS — B37.31 VAGINAL CANDIDIASIS: Primary | ICD-10-CM

## 2022-11-01 LAB
ALBUMIN UR-MCNC: NEGATIVE MG/DL
APPEARANCE UR: CLEAR
BACTERIA #/AREA URNS HPF: ABNORMAL /HPF
BILIRUB UR QL STRIP: NEGATIVE
CLUE CELLS: ABNORMAL
COLOR UR AUTO: YELLOW
GLUCOSE UR STRIP-MCNC: NEGATIVE MG/DL
HGB UR QL STRIP: NEGATIVE
KETONES UR STRIP-MCNC: NEGATIVE MG/DL
LEUKOCYTE ESTERASE UR QL STRIP: ABNORMAL
MUCOUS THREADS #/AREA URNS LPF: PRESENT /LPF
NITRATE UR QL: NEGATIVE
PH UR STRIP: 6 [PH] (ref 5–7)
RBC #/AREA URNS AUTO: ABNORMAL /HPF
SP GR UR STRIP: >=1.03 (ref 1–1.03)
SQUAMOUS #/AREA URNS AUTO: ABNORMAL /LPF
TRICHOMONAS, WET PREP: ABNORMAL
UROBILINOGEN UR STRIP-ACNC: 1 E.U./DL
WBC #/AREA URNS AUTO: ABNORMAL /HPF
WBC'S/HIGH POWER FIELD, WET PREP: ABNORMAL
YEAST #/AREA URNS HPF: ABNORMAL /HPF
YEAST #/AREA URNS HPF: ABNORMAL /HPF
YEAST, WET PREP: PRESENT

## 2022-11-01 PROCEDURE — 87210 SMEAR WET MOUNT SALINE/INK: CPT | Performed by: FAMILY MEDICINE

## 2022-11-01 PROCEDURE — 81001 URINALYSIS AUTO W/SCOPE: CPT | Performed by: FAMILY MEDICINE

## 2022-11-01 PROCEDURE — 99213 OFFICE O/P EST LOW 20 MIN: CPT | Performed by: FAMILY MEDICINE

## 2022-11-01 RX ORDER — FLUCONAZOLE 150 MG/1
150 TABLET ORAL ONCE
Qty: 1 TABLET | Refills: 0 | Status: SHIPPED | OUTPATIENT
Start: 2022-11-01 | End: 2022-11-01

## 2022-11-01 ASSESSMENT — ANXIETY QUESTIONNAIRES
8. IF YOU CHECKED OFF ANY PROBLEMS, HOW DIFFICULT HAVE THESE MADE IT FOR YOU TO DO YOUR WORK, TAKE CARE OF THINGS AT HOME, OR GET ALONG WITH OTHER PEOPLE?: NOT DIFFICULT AT ALL
1. FEELING NERVOUS, ANXIOUS, OR ON EDGE: NOT AT ALL
5. BEING SO RESTLESS THAT IT IS HARD TO SIT STILL: NOT AT ALL
3. WORRYING TOO MUCH ABOUT DIFFERENT THINGS: NOT AT ALL
2. NOT BEING ABLE TO STOP OR CONTROL WORRYING: NOT AT ALL
IF YOU CHECKED OFF ANY PROBLEMS ON THIS QUESTIONNAIRE, HOW DIFFICULT HAVE THESE PROBLEMS MADE IT FOR YOU TO DO YOUR WORK, TAKE CARE OF THINGS AT HOME, OR GET ALONG WITH OTHER PEOPLE: NOT DIFFICULT AT ALL
6. BECOMING EASILY ANNOYED OR IRRITABLE: SEVERAL DAYS
GAD7 TOTAL SCORE: 1
4. TROUBLE RELAXING: NOT AT ALL
7. FEELING AFRAID AS IF SOMETHING AWFUL MIGHT HAPPEN: NOT AT ALL
7. FEELING AFRAID AS IF SOMETHING AWFUL MIGHT HAPPEN: NOT AT ALL
GAD7 TOTAL SCORE: 1
GAD7 TOTAL SCORE: 1

## 2022-11-01 ASSESSMENT — PATIENT HEALTH QUESTIONNAIRE - PHQ9
SUM OF ALL RESPONSES TO PHQ QUESTIONS 1-9: 1
SUM OF ALL RESPONSES TO PHQ QUESTIONS 1-9: 1
10. IF YOU CHECKED OFF ANY PROBLEMS, HOW DIFFICULT HAVE THESE PROBLEMS MADE IT FOR YOU TO DO YOUR WORK, TAKE CARE OF THINGS AT HOME, OR GET ALONG WITH OTHER PEOPLE: NOT DIFFICULT AT ALL

## 2022-11-01 ASSESSMENT — ENCOUNTER SYMPTOMS
FEVER: 0
FREQUENCY: 0
DYSURIA: 0

## 2022-11-01 NOTE — PROGRESS NOTES
Assessment & Plan     Vaginal candidiasis  Acute problem, likely related to recent abx use. start diflucan. No symptoms of UTI.   - UA Macro with Reflex to Micro and Culture - lab collect  - Wet prep - lab collect  - UA Macro with Reflex to Micro and Culture - lab collect  - Wet prep - lab collect  - Urine Microscopic  - fluconazole (DIFLUCAN) 150 MG tablet  Dispense: 1 tablet; Refill: 0          Return in about 1 week (around 11/8/2022).    Mann Jordan MD  [facility] CURT Pinon is a 31 year old, presenting for the following health issues:  Vaginal Problem      Vaginal Problem   Pertinent negatives include no fever, no dysuria and no frequency.   History of Present Illness       Reason for visit:  I think I have BV  Symptom onset:  1-3 days ago  Symptoms include:  Preston discharge,itchy, and red coloration  Symptom intensity:  Moderate  Symptom progression:  Worsening  Had these symptoms before:  Yes  Has tried/received treatment for these symptoms:  Yes  Previous treatment was successful:  Yes  Prior treatment description:  Antibiotics  What makes it worse:  No  What makes it better:  Antibiotics and a shower    She eats 0-1 servings of fruits and vegetables daily.She consumes 1 sweetened beverage(s) daily.She exercises with enough effort to increase her heart rate 30 to 60 minutes per day.  She exercises with enough effort to increase her heart rate 3 or less days per week. She is missing 3 dose(s) of medications per week.    Today's PHQ-9         PHQ-9 Total Score: 1    PHQ-9 Q9 Thoughts of better off dead/self-harm past 2 weeks :   Not at all    How difficult have these problems made it for you to do your work, take care of things at home, or get along with other people: Not difficult at all  Today's JAROD-7 Score: 1     Presents for concern of vaginal discharge. Recently on abx for an infection.     Review of Systems   Constitutional: Negative for fever.   Genitourinary:  "Positive for vaginal discharge. Negative for dysuria and frequency.            Objective    /68   Pulse 98   Temp 98.2  F (36.8  C)   Resp 12   Ht 1.753 m (5' 9\")   Wt 105.7 kg (233 lb)   LMP 10/13/2022 (Exact Date)   BMI 34.41 kg/m    Body mass index is 34.41 kg/m .  Physical Exam  Vitals reviewed.   Constitutional:       Appearance: She is not ill-appearing.            Results for orders placed or performed in visit on 11/01/22 (from the past 24 hour(s))   UA Macro with Reflex to Micro and Culture - lab collect    Specimen: Urine, Clean Catch   Result Value Ref Range    Color Urine Yellow Colorless, Straw, Light Yellow, Yellow    Appearance Urine Clear Clear    Glucose Urine Negative Negative, 1000 , >=2000 mg/dL    Bilirubin Urine Negative Negative    Ketones Urine Negative Negative, 160  mg/dL    Specific Gravity Urine >=1.030 1.003 - 1.035    Blood Urine Negative Negative    pH Urine 6.0 5.0 - 7.0    Protein Albumin Urine Negative Negative, 300 , >=2000 mg/dL    Urobilinogen Urine 1.0 0.2, 1.0 E.U./dL    Nitrite Urine Negative Negative    Leukocyte Esterase Urine Trace (A) Negative   Wet prep - lab collect    Specimen: Vagina; Swab   Result Value Ref Range    Trichomonas Absent Absent    Yeast Present (A) Absent    Clue Cells Absent Absent    WBCs/high power field 3+ (A) None   Urine Microscopic   Result Value Ref Range    Bacteria Urine Few (A) None Seen /HPF    RBC Urine None Seen 0-2 /HPF /HPF    WBC Urine 0-5 0-5 /HPF /HPF    Squamous Epithelials Urine Moderate (A) None Seen /LPF    Budding Yeast Urine Few (A) None Seen /HPF    Hyphal Yeast Urine Few (A) None Seen /HPF    Mucus Urine Present (A) None Seen /LPF    Narrative    Urine Culture not indicated                   "

## 2023-12-21 ENCOUNTER — HOSPITAL ENCOUNTER (EMERGENCY)
Facility: CLINIC | Age: 33
Discharge: HOME | End: 2023-12-21
Attending: EMERGENCY MEDICINE | Admitting: EMERGENCY MEDICINE
Payer: COMMERCIAL

## 2023-12-21 ENCOUNTER — APPOINTMENT (OUTPATIENT)
Dept: CT IMAGING | Facility: CLINIC | Age: 33
End: 2023-12-21
Attending: EMERGENCY MEDICINE
Payer: COMMERCIAL

## 2023-12-21 VITALS
HEART RATE: 95 BPM | DIASTOLIC BLOOD PRESSURE: 87 MMHG | TEMPERATURE: 97.5 F | SYSTOLIC BLOOD PRESSURE: 135 MMHG | RESPIRATION RATE: 20 BRPM | OXYGEN SATURATION: 97 %

## 2023-12-21 DIAGNOSIS — M62.838 NECK MUSCLE SPASM: ICD-10-CM

## 2023-12-21 PROCEDURE — 250N000011 HC RX IP 250 OP 636: Performed by: EMERGENCY MEDICINE

## 2023-12-21 PROCEDURE — 99285 EMERGENCY DEPT VISIT HI MDM: CPT | Mod: 25

## 2023-12-21 PROCEDURE — 250N000013 HC RX MED GY IP 250 OP 250 PS 637: Performed by: EMERGENCY MEDICINE

## 2023-12-21 PROCEDURE — 72125 CT NECK SPINE W/O DYE: CPT

## 2023-12-21 PROCEDURE — 96372 THER/PROPH/DIAG INJ SC/IM: CPT | Performed by: EMERGENCY MEDICINE

## 2023-12-21 RX ORDER — CYCLOBENZAPRINE HCL 10 MG
10 TABLET ORAL 3 TIMES DAILY PRN
Qty: 20 TABLET | Refills: 0 | Status: SHIPPED | OUTPATIENT
Start: 2023-12-21 | End: 2023-12-28

## 2023-12-21 RX ORDER — KETOROLAC TROMETHAMINE 30 MG/ML
30 INJECTION, SOLUTION INTRAMUSCULAR; INTRAVENOUS ONCE
Status: COMPLETED | OUTPATIENT
Start: 2023-12-21 | End: 2023-12-21

## 2023-12-21 RX ORDER — DIAZEPAM 5 MG
5 TABLET ORAL ONCE
Status: COMPLETED | OUTPATIENT
Start: 2023-12-21 | End: 2023-12-21

## 2023-12-21 RX ADMIN — DIAZEPAM 5 MG: 5 TABLET ORAL at 14:32

## 2023-12-21 RX ADMIN — KETOROLAC TROMETHAMINE 30 MG: 30 INJECTION, SOLUTION INTRAMUSCULAR; INTRAVENOUS at 14:32

## 2023-12-21 ASSESSMENT — ACTIVITIES OF DAILY LIVING (ADL)
ADLS_ACUITY_SCORE: 35

## 2023-12-21 NOTE — ED PROVIDER NOTES
"  History     Chief Complaint:  Neck Pain    The history is provided by the patient.     Zi Mirza is a 33 year old female with no past pertinent medical history who presents to the emergency department for neck pain. The patient states that today while driving, she turned her head to the right to check her blind spot in which she heard an audible \"pop.\" She reports that now, she is currently experiencing a left-sided neck pain that is exacerbated upon turning her head. She notes that when she was leaning back while playing with her daughter, she experienced a \"shooting\" pain. Denies any pain in her back. Denies hx of herniated disc. Denies being currently pregnant.    Independent Historian:   None - Patient Only    Review of External Notes:   None     Medications:    ParaGard    Past Medical History:    No past pertinent medical history     Physical Exam   Patient Vitals for the past 24 hrs:   BP Temp Temp src Pulse Resp SpO2   12/21/23 1137 129/84 97.5  F (36.4  C) Temporal 103 20 99 %      Physical Exam  Constitutional:       Appearance: She is well-developed.   HENT:      Right Ear: External ear normal.      Left Ear: External ear normal.      Mouth/Throat:      Mouth: Mucous membranes are moist.      Pharynx: Oropharynx is clear. No oropharyngeal exudate.   Eyes:      General: No scleral icterus.     Conjunctiva/sclera: Conjunctivae normal.      Pupils: Pupils are equal, round, and reactive to light.   Neck:      Comments: TTP L side of neck without swelling or erythema  Cardiovascular:      Rate and Rhythm: Normal rate and regular rhythm.      Heart sounds: Normal heart sounds. No murmur heard.     No friction rub. No gallop.   Pulmonary:      Effort: Pulmonary effort is normal. No respiratory distress.      Breath sounds: Normal breath sounds. No wheezing or rales.   Musculoskeletal:         General: Normal range of motion.      Cervical back: Normal range of motion and neck supple. Tenderness present. "      Comments: 5/5 strength in BUE. 2+ BUE pulses. Sensation to light touch intact in BUE   Lymphadenopathy:      Cervical: No cervical adenopathy.   Skin:     General: Skin is warm and dry.      Findings: No rash.   Neurological:      Mental Status: She is alert.           Emergency Department Course     Imaging:  CT Cervical Spine w/o Contrast   Final Result   IMPRESSION: No acute cervical spine fracture.         STEFANIE ALEX MD            SYSTEM ID:  [id number]         Read by radiologist.    Emergency Department Course & Assessments:    Interventions:  Medications   diazepam (VALIUM) tablet 5 mg (5 mg Oral $Given 12/21/23 1432)   ketorolac (TORADOL) injection 30 mg (30 mg Intramuscular $Given 12/21/23 1432)      Assessments:  1416 I obtained history and examined the patient as noted above.   1915 I discussed findings and discharge with the patient. All questions answered. She was feeling much better after toradol and valium    Independent Interpretation (X-rays, CTs, rhythm strip):  None    Consultations/Discussion of Management or Tests:  None     Social Determinants of Health affecting care:   None    Disposition:  The patient was discharged to home.     Impression & Plan      Medical Decision Making:  Patient presents today for left-sided neck pain.  She had difficulty moving her neck side-to-side due to pain.  The pain seems to be on the left side and from the neck to the top of her shoulder.  There is no radicular pain.  She has no numbness tingling in her left arm to suggest cervical radiculopathy.  She does have some mild palpable tenderness on exam.  CT did not show any acute finding.  Most likely this is muscle spasm.  She did respond very well to Toradol and Valium.  I explained the finding and conservative management home with her.  She is advised to take ibuprofen 600 mg 3-4 times a day.  She can use a heating pad.  I also gave her a prescription of Flexeril that she can use as needed.  She is aware  that she should not be driving or using alcohol while taking Flexeril.  She is referred back to her primary clinic follow-up.  Return precaution provided.    Diagnosis:    ICD-10-CM    1. Neck muscle spasm  M62.838            Discharge Medications:  New Prescriptions    CYCLOBENZAPRINE (FLEXERIL) 10 MG TABLET    Take 1 tablet (10 mg) by mouth 3 times daily as needed for muscle spasms      Scribe Disclosure:  I, Dariel Castaneda, am serving as a scribe at 2:19 PM on 12/21/2023 to document services personally performed by Hilda Breen MD based on my observations and the provider's statements to me.     12/21/2023   Hilda Breen MD Cheng, Wenlan, MD  12/21/23 1925

## 2023-12-21 NOTE — ED TRIAGE NOTES
Patient was driving, turned her neck left to check her blind spot and heard a loud pop. Now having sharp pain on the left side of the neck. Denies pain, numbness, or tingling to the left side. Patient keeping her head still facing forward. When she turns her head, 9/10 pain prior to arrival

## 2023-12-22 NOTE — DISCHARGE INSTRUCTIONS
Ibuprofen/advil/motrin 600mg every 6-8 hours as needed for pain  Heating pad to neck  Flexeril for muscle relaxant as needed. No driving or alcohol while using the flexeril  Recheck with your doctor next week if not better

## 2024-06-01 ENCOUNTER — APPOINTMENT (OUTPATIENT)
Dept: ULTRASOUND IMAGING | Facility: CLINIC | Age: 34
End: 2024-06-01
Attending: PHYSICIAN ASSISTANT
Payer: COMMERCIAL

## 2024-06-01 ENCOUNTER — APPOINTMENT (OUTPATIENT)
Dept: CT IMAGING | Facility: CLINIC | Age: 34
End: 2024-06-01
Attending: PHYSICIAN ASSISTANT
Payer: COMMERCIAL

## 2024-06-01 ENCOUNTER — HOSPITAL ENCOUNTER (EMERGENCY)
Facility: CLINIC | Age: 34
Discharge: HOME | End: 2024-06-01
Attending: PHYSICIAN ASSISTANT | Admitting: PHYSICIAN ASSISTANT
Payer: COMMERCIAL

## 2024-06-01 VITALS
DIASTOLIC BLOOD PRESSURE: 89 MMHG | RESPIRATION RATE: 9 BRPM | HEIGHT: 70 IN | TEMPERATURE: 98.1 F | WEIGHT: 233.8 LBS | SYSTOLIC BLOOD PRESSURE: 131 MMHG | HEART RATE: 63 BPM | OXYGEN SATURATION: 100 % | BODY MASS INDEX: 33.47 KG/M2

## 2024-06-01 DIAGNOSIS — K21.9 GASTROESOPHAGEAL REFLUX DISEASE, UNSPECIFIED WHETHER ESOPHAGITIS PRESENT: ICD-10-CM

## 2024-06-01 DIAGNOSIS — R10.13 EPIGASTRIC PAIN: ICD-10-CM

## 2024-06-01 LAB
ALBUMIN SERPL BCG-MCNC: 4.4 G/DL (ref 3.5–5.2)
ALP SERPL-CCNC: 64 U/L (ref 40–150)
ALT SERPL W P-5'-P-CCNC: 21 U/L (ref 0–50)
ANION GAP SERPL CALCULATED.3IONS-SCNC: 14 MMOL/L (ref 7–15)
AST SERPL W P-5'-P-CCNC: 19 U/L (ref 0–45)
BASOPHILS # BLD AUTO: 0 10E3/UL (ref 0–0.2)
BASOPHILS NFR BLD AUTO: 0 %
BILIRUB SERPL-MCNC: <0.2 MG/DL
BUN SERPL-MCNC: 10.7 MG/DL (ref 6–20)
CALCIUM SERPL-MCNC: 9.5 MG/DL (ref 8.6–10)
CHLORIDE SERPL-SCNC: 103 MMOL/L (ref 98–107)
CREAT SERPL-MCNC: 0.84 MG/DL (ref 0.51–0.95)
DEPRECATED HCO3 PLAS-SCNC: 23 MMOL/L (ref 22–29)
EGFRCR SERPLBLD CKD-EPI 2021: >90 ML/MIN/1.73M2
EOSINOPHIL # BLD AUTO: 0.1 10E3/UL (ref 0–0.7)
EOSINOPHIL NFR BLD AUTO: 2 %
ERYTHROCYTE [DISTWIDTH] IN BLOOD BY AUTOMATED COUNT: 12.4 % (ref 10–15)
GLUCOSE SERPL-MCNC: 111 MG/DL (ref 70–99)
HCG SERPL QL: NEGATIVE
HCO3 BLDV-SCNC: 24 MMOL/L (ref 21–28)
HCT VFR BLD AUTO: 42.4 % (ref 35–47)
HGB BLD-MCNC: 14 G/DL (ref 11.7–15.7)
HOLD SPECIMEN: NORMAL
IMM GRANULOCYTES # BLD: 0 10E3/UL
IMM GRANULOCYTES NFR BLD: 1 %
LACTATE BLD-SCNC: 1.7 MMOL/L
LIPASE SERPL-CCNC: 16 U/L (ref 13–60)
LYMPHOCYTES # BLD AUTO: 2.5 10E3/UL (ref 0.8–5.3)
LYMPHOCYTES NFR BLD AUTO: 37 %
MCH RBC QN AUTO: 29.4 PG (ref 26.5–33)
MCHC RBC AUTO-ENTMCNC: 33 G/DL (ref 31.5–36.5)
MCV RBC AUTO: 89 FL (ref 78–100)
MONOCYTES # BLD AUTO: 0.6 10E3/UL (ref 0–1.3)
MONOCYTES NFR BLD AUTO: 9 %
NEUTROPHILS # BLD AUTO: 3.4 10E3/UL (ref 1.6–8.3)
NEUTROPHILS NFR BLD AUTO: 51 %
NRBC # BLD AUTO: 0 10E3/UL
NRBC BLD AUTO-RTO: 0 /100
PCO2 BLDV: 40 MM HG (ref 40–50)
PH BLDV: 7.39 [PH] (ref 7.32–7.43)
PLATELET # BLD AUTO: 329 10E3/UL (ref 150–450)
PO2 BLDV: 23 MM HG (ref 25–47)
POTASSIUM SERPL-SCNC: 3.7 MMOL/L (ref 3.4–5.3)
PROT SERPL-MCNC: 7.6 G/DL (ref 6.4–8.3)
RBC # BLD AUTO: 4.77 10E6/UL (ref 3.8–5.2)
SAO2 % BLDV: 40 % (ref 70–75)
SODIUM SERPL-SCNC: 140 MMOL/L (ref 135–145)
TROPONIN T SERPL HS-MCNC: <6 NG/L
WBC # BLD AUTO: 6.6 10E3/UL (ref 4–11)

## 2024-06-01 PROCEDURE — 93005 ELECTROCARDIOGRAM TRACING: CPT

## 2024-06-01 PROCEDURE — 96374 THER/PROPH/DIAG INJ IV PUSH: CPT | Mod: 59

## 2024-06-01 PROCEDURE — 99285 EMERGENCY DEPT VISIT HI MDM: CPT | Mod: 25

## 2024-06-01 PROCEDURE — 258N000003 HC RX IP 258 OP 636: Performed by: PHYSICIAN ASSISTANT

## 2024-06-01 PROCEDURE — 250N000013 HC RX MED GY IP 250 OP 250 PS 637: Performed by: PHYSICIAN ASSISTANT

## 2024-06-01 PROCEDURE — 36415 COLL VENOUS BLD VENIPUNCTURE: CPT | Performed by: PHYSICIAN ASSISTANT

## 2024-06-01 PROCEDURE — 96361 HYDRATE IV INFUSION ADD-ON: CPT

## 2024-06-01 PROCEDURE — 84703 CHORIONIC GONADOTROPIN ASSAY: CPT | Performed by: PHYSICIAN ASSISTANT

## 2024-06-01 PROCEDURE — 85025 COMPLETE CBC W/AUTO DIFF WBC: CPT | Performed by: PHYSICIAN ASSISTANT

## 2024-06-01 PROCEDURE — 80053 COMPREHEN METABOLIC PANEL: CPT | Performed by: PHYSICIAN ASSISTANT

## 2024-06-01 PROCEDURE — 96375 TX/PRO/DX INJ NEW DRUG ADDON: CPT

## 2024-06-01 PROCEDURE — C9113 INJ PANTOPRAZOLE SODIUM, VIA: HCPCS | Performed by: PHYSICIAN ASSISTANT

## 2024-06-01 PROCEDURE — 76705 ECHO EXAM OF ABDOMEN: CPT

## 2024-06-01 PROCEDURE — 74177 CT ABD & PELVIS W/CONTRAST: CPT

## 2024-06-01 PROCEDURE — 83690 ASSAY OF LIPASE: CPT | Performed by: PHYSICIAN ASSISTANT

## 2024-06-01 PROCEDURE — 250N000011 HC RX IP 250 OP 636: Performed by: PHYSICIAN ASSISTANT

## 2024-06-01 PROCEDURE — 82803 BLOOD GASES ANY COMBINATION: CPT

## 2024-06-01 PROCEDURE — 250N000009 HC RX 250: Performed by: PHYSICIAN ASSISTANT

## 2024-06-01 PROCEDURE — 84484 ASSAY OF TROPONIN QUANT: CPT | Performed by: PHYSICIAN ASSISTANT

## 2024-06-01 RX ORDER — HYDROMORPHONE HYDROCHLORIDE 1 MG/ML
0.5 INJECTION, SOLUTION INTRAMUSCULAR; INTRAVENOUS; SUBCUTANEOUS ONCE
Status: COMPLETED | OUTPATIENT
Start: 2024-06-01 | End: 2024-06-01

## 2024-06-01 RX ORDER — SUCRALFATE 1 G/1
1 TABLET ORAL 4 TIMES DAILY PRN
Qty: 20 TABLET | Refills: 0 | Status: SHIPPED | OUTPATIENT
Start: 2024-06-01

## 2024-06-01 RX ORDER — MAGNESIUM HYDROXIDE/ALUMINUM HYDROXICE/SIMETHICONE 120; 1200; 1200 MG/30ML; MG/30ML; MG/30ML
15 SUSPENSION ORAL ONCE
Status: COMPLETED | OUTPATIENT
Start: 2024-06-01 | End: 2024-06-01

## 2024-06-01 RX ORDER — IOPAMIDOL 755 MG/ML
500 INJECTION, SOLUTION INTRAVASCULAR ONCE
Status: COMPLETED | OUTPATIENT
Start: 2024-06-01 | End: 2024-06-01

## 2024-06-01 RX ADMIN — PANTOPRAZOLE SODIUM 40 MG: 40 INJECTION, POWDER, FOR SOLUTION INTRAVENOUS at 16:59

## 2024-06-01 RX ADMIN — SODIUM CHLORIDE 65 ML: 9 INJECTION, SOLUTION INTRAVENOUS at 15:22

## 2024-06-01 RX ADMIN — IOPAMIDOL 100 ML: 755 INJECTION, SOLUTION INTRAVENOUS at 15:22

## 2024-06-01 RX ADMIN — ALUMINUM HYDROXIDE, MAGNESIUM HYDROXIDE, AND SIMETHICONE 15 ML: 1200; 120; 1200 SUSPENSION ORAL at 16:59

## 2024-06-01 RX ADMIN — HYDROMORPHONE HYDROCHLORIDE 0.5 MG: 1 INJECTION, SOLUTION INTRAMUSCULAR; INTRAVENOUS; SUBCUTANEOUS at 13:26

## 2024-06-01 RX ADMIN — SODIUM CHLORIDE 1000 ML: 9 INJECTION, SOLUTION INTRAVENOUS at 13:24

## 2024-06-01 ASSESSMENT — COLUMBIA-SUICIDE SEVERITY RATING SCALE - C-SSRS
6. HAVE YOU EVER DONE ANYTHING, STARTED TO DO ANYTHING, OR PREPARED TO DO ANYTHING TO END YOUR LIFE?: NO
2. HAVE YOU ACTUALLY HAD ANY THOUGHTS OF KILLING YOURSELF IN THE PAST MONTH?: NO
1. IN THE PAST MONTH, HAVE YOU WISHED YOU WERE DEAD OR WISHED YOU COULD GO TO SLEEP AND NOT WAKE UP?: NO

## 2024-06-01 ASSESSMENT — ACTIVITIES OF DAILY LIVING (ADL)
ADLS_ACUITY_SCORE: 35

## 2024-06-01 NOTE — ED NOTES
Bed: [facility]  Expected date: 6/1/24  Expected time:   Means of arrival: Ambulance  Comments:  Triage: Maryann [phone]

## 2024-06-01 NOTE — ED PROVIDER NOTES
"  Emergency Department Note      History of Present Illness     Chief Complaint  Abdominal Pain    HPI  Zi Mirza is a 33 year old female otherwise healthy who presents for evaluation of periumbilical and upper abdominal pain that began a few hours ago.  She notes the pain is severe.  She has felt nauseous but denies vomiting diarrhea or black or bloody stools.  No urinary symptoms.  Occasionally radiates up into the chest but no shortness of breath.  No fever.  Has never had surgery on her abdomen.  Denies any unusual vaginal bleeding or discharge or concern for STI.  No significant alcohol or NSAID use.  She does note that her diet has not been as good lately.  No recent head injuries headache or vision changes. Had one similar episode previously a few years ago.    Independent Historian  Significant other also provides that patient seems very uncomfortable/out of it.    Review of External Notes  I reviewed Dr. Flavia patel from [place] ED in 2022 where pt seen for epigastric pain  Past Medical History   Medical History and Problem List  Past Medical History:   Diagnosis Date    No pertinent past medical history        Medications  omeprazole (PRILOSEC) 20 MG DR capsule  sucralfate (CARAFATE) 1 GM tablet  paragard intrauterine copper device        Surgical History   Past Surgical History:   Procedure Laterality Date    NO HISTORY OF SURGERY       Physical Exam   Patient Vitals for the past 24 hrs:   BP Temp Temp src Pulse Resp SpO2 Height Weight   06/01/24 1715 131/89 -- -- -- -- 100 % -- --   06/01/24 1500 118/66 -- -- 63 -- 100 % -- --   06/01/24 1417 117/82 -- -- 75 -- 100 % -- --   06/01/24 1415 117/82 -- -- 75 -- 97 % -- --   06/01/24 1330 109/68 -- -- 75 (!) 9 99 % -- --   06/01/24 1315 129/86 -- -- 79 -- 100 % -- --   06/01/24 1313 129/86 98.1  F (36.7  C) Oral 86 18 100 % 1.778 m (5' 10\") 106.1 kg (233 lb 12.8 oz)     Physical Exam  General: Awake, alert, non-toxic. Very uncomfortable " appearing.  Head:  Scalp is NC/AT  Eyes:  Conjunctiva normal, PERRL  ENT:  The external nose and ears are normal.     Oropharynx clear, uvula midline.  Neck:  Normal range of motion without rigidity.  CV:  Regular rate and rhythm    No pathologic murmur, rubs, or gallops.  Resp:  Breath sounds are clear bilaterally    Non-labored, no retractions or accessory muscle use  Abdomen: Abdomen is soft, no distension, Periumbilical and epigastric ttp.  Otherwise non-tender.  No rebound or guarding, no masses. No CVA tenderness.  MS:  No lower extremity edema/swelling. No midline cervical, thoracic, or lumbar tenderness.   Skin:  Warm and dry, No rash or lesions noted.  Neuro:  Alert and oriented.  GCS 15 Moves all extremities normal.  No facial asymmetry. Gait normal.  Psych: Awake. Alert. Normal affect. Appropriate interactions.    Diagnostics   Lab Results   Labs Ordered and Resulted from Time of ED Arrival to Time of ED Departure   ISTAT GASES LACTATE VENOUS POCT - Abnormal       Result Value    Lactic Acid POCT 1.7      Bicarbonate Venous POCT 24      O2 Sat, Venous POCT 40 (*)     pCO2 Venous POCT 40      pH Venous POCT 7.39      pO2 Venous POCT 23 (*)    COMPREHENSIVE METABOLIC PANEL - Abnormal    Sodium 140      Potassium 3.7      Carbon Dioxide (CO2) 23      Anion Gap 14      Urea Nitrogen 10.7      Creatinine 0.84      GFR Estimate >90      Calcium 9.5      Chloride 103      Glucose 111 (*)     Alkaline Phosphatase 64      AST 19      ALT 21      Protein Total 7.6      Albumin 4.4      Bilirubin Total <0.2     HCG QUALITATIVE PREGNANCY - Normal    hCG Serum Qualitative Negative     LIPASE - Normal    Lipase 16     TROPONIN T, HIGH SENSITIVITY - Normal    Troponin T, High Sensitivity <6     CBC WITH PLATELETS AND DIFFERENTIAL    WBC Count 6.6      RBC Count 4.77      Hemoglobin 14.0      Hematocrit 42.4      MCV 89      MCH 29.4      MCHC 33.0      RDW 12.4      Platelet Count 329      % Neutrophils 51      %  Lymphocytes 37      % Monocytes 9      % Eosinophils 2      % Basophils 0      % Immature Granulocytes 1      NRBCs per 100 WBC 0      Absolute Neutrophils 3.4      Absolute Lymphocytes 2.5      Absolute Monocytes 0.6      Absolute Eosinophils 0.1      Absolute Basophils 0.0      Absolute Immature Granulocytes 0.0      Absolute NRBCs 0.0         Imaging  CT Abdomen Pelvis w Contrast   Final Result   IMPRESSION:       1.  No mechanical bowel obstruction or focal bowel inflammation.   2.  Mild free fluid in the pelvis and trace free fluid in the upper quadrants.      US Abdomen Limited   Final Result   IMPRESSION:   1.  No acute process demonstrated.              ECG results from 06/01/24   EKG 12 lead     Value    Systolic Blood Pressure     Diastolic Blood Pressure     Ventricular Rate 78    Atrial Rate 78    SD Interval 158    QRS Duration 90        QTc 435    P Axis 59    R AXIS 51    T Axis 43    Interpretation ECG      Sinus rhythm  Normal ECG  When compared with ECG of 12-JUN-2018 18:28,  No significant change was found           Independent Interpretation  None  ED Course    Medications Administered  Medications   sodium chloride 0.9% BOLUS 1,000 mL (0 mLs Intravenous Stopped 6/1/24 1659)   HYDROmorphone (PF) (DILAUDID) injection 0.5 mg (0.5 mg Intravenous $Given 6/1/24 1326)   iopamidol (ISOVUE-370) solution 500 mL (100 mLs Intravenous $Given 6/1/24 1522)   CT scan flush (65 mLs Intravenous $Given 6/1/24 1522)   pantoprazole (PROTONIX) IV push injection 40 mg (40 mg Intravenous $Given 6/1/24 1659)   alum & mag hydroxide-simethicone (MAALOX) suspension 15 mL (15 mLs Oral $Given 6/1/24 1659)       Procedures  Procedures     Discussion of Management  None    Social Determinants of Health adding to complexity of care  None    ED Course   I performed an eval of the pt as above.  I re-checked the pt.  She feels improved tolerating PO.  Medical Decision Making / Diagnosis   CMS Diagnoses: None    MIPS      None    [facility]  Zi Mirza is a 33 year old female who presents for evaluation of sudden onset abdominal pain.  Workup here reassuring.  Initially very uncomfortable.  Ultrasound is negative and CT abdomen pelvis with contrast was pursued after discussion of risks and benefits which shows a little bit of nonspecific free fluid but is otherwise unremarkable with no evidence of intra-abdominal catastrophe.  Afebrile with normal white blood cell count lipase and LFTs.  EKG is normal high-sensitivity troponin is undetectable do not suspect referred pain from atypical ACS no shortness of breath hypoxia tachycardia etc. nothing to suggest PE thoracic dissection pneumonia pneumothorax or other intrathoracic catastrophe.  Patient is not pregnant.  No urinary symptoms.  No clinical symptoms to suggest PID and no ovarian cysts or masses to suggest torsion pain is epigastric and periumbilical.  Likely represents gastritis versus PUD versus GERD.  Has noted some dietary indiscretion lately.  Not currently taking any antacids I will start her on this.  Close outpatient follow-up with PCP and return precautions for new worsening or changing symptoms were discussed.    Disposition  The patient was discharged.     ICD-10 Codes:    ICD-10-CM    1. Epigastric pain  R10.13       2. Gastroesophageal reflux disease, unspecified whether esophagitis present  K21.9     suspected           Discharge Medications  Discharge Medication List as of 6/1/2024  5:07 PM        START taking these medications    Details   omeprazole (PRILOSEC) 20 MG DR capsule Take 1 capsule (20 mg) by mouth daily for 21 days, Disp-21 capsule, R-0, E-Prescribe      sucralfate (CARAFATE) 1 GM tablet Take 1 tablet (1 g) by mouth 4 times daily as needed for nausea (Pain), Disp-20 tablet, R-0, E-Prescribe                  Samir Jones PA-C  06/01/24 8621

## 2024-06-03 LAB
ATRIAL RATE - MUSE: 78 BPM
DIASTOLIC BLOOD PRESSURE - MUSE: NORMAL MMHG
INTERPRETATION ECG - MUSE: NORMAL
P AXIS - MUSE: 59 DEGREES
PR INTERVAL - MUSE: 158 MS
QRS DURATION - MUSE: 90 MS
QT - MUSE: 382 MS
QTC - MUSE: 435 MS
R AXIS - MUSE: 51 DEGREES
SYSTOLIC BLOOD PRESSURE - MUSE: NORMAL MMHG
T AXIS - MUSE: 43 DEGREES
VENTRICULAR RATE- MUSE: 78 BPM

## 2024-12-05 ENCOUNTER — APPOINTMENT (OUTPATIENT)
Dept: GENERAL RADIOLOGY | Facility: CLINIC | Age: 34
End: 2024-12-05

## 2024-12-05 ENCOUNTER — HOSPITAL ENCOUNTER (EMERGENCY)
Facility: CLINIC | Age: 34
Discharge: HOME | End: 2024-12-05

## 2024-12-05 VITALS
RESPIRATION RATE: 18 BRPM | HEIGHT: 69 IN | SYSTOLIC BLOOD PRESSURE: 124 MMHG | WEIGHT: 263.45 LBS | BODY MASS INDEX: 39.02 KG/M2 | TEMPERATURE: 97 F | HEART RATE: 120 BPM | OXYGEN SATURATION: 100 % | DIASTOLIC BLOOD PRESSURE: 82 MMHG

## 2024-12-05 DIAGNOSIS — M53.3 COCCYDYNIA: ICD-10-CM

## 2024-12-05 PROCEDURE — 250N000013 HC RX MED GY IP 250 OP 250 PS 637

## 2024-12-05 PROCEDURE — 99283 EMERGENCY DEPT VISIT LOW MDM: CPT

## 2024-12-05 PROCEDURE — 72220 X-RAY EXAM SACRUM TAILBONE: CPT

## 2024-12-05 RX ORDER — OXYCODONE AND ACETAMINOPHEN 5; 325 MG/1; MG/1
1 TABLET ORAL ONCE
Status: COMPLETED | OUTPATIENT
Start: 2024-12-05 | End: 2024-12-05

## 2024-12-05 RX ORDER — IBUPROFEN 600 MG/1
600 TABLET, FILM COATED ORAL ONCE
Status: COMPLETED | OUTPATIENT
Start: 2024-12-05 | End: 2024-12-05

## 2024-12-05 RX ORDER — NAPROXEN 500 MG/1
500 TABLET ORAL 2 TIMES DAILY WITH MEALS
Qty: 60 TABLET | Refills: 0 | Status: SHIPPED | OUTPATIENT
Start: 2024-12-05 | End: 2025-01-04

## 2024-12-05 RX ADMIN — OXYCODONE HYDROCHLORIDE AND ACETAMINOPHEN 1 TABLET: 5; 325 TABLET ORAL at 16:57

## 2024-12-05 RX ADMIN — IBUPROFEN 600 MG: 600 TABLET, FILM COATED ORAL at 16:57

## 2024-12-05 ASSESSMENT — ACTIVITIES OF DAILY LIVING (ADL)
ADLS_ACUITY_SCORE: 46
ADLS_ACUITY_SCORE: 46

## 2024-12-05 ASSESSMENT — COLUMBIA-SUICIDE SEVERITY RATING SCALE - C-SSRS
6. HAVE YOU EVER DONE ANYTHING, STARTED TO DO ANYTHING, OR PREPARED TO DO ANYTHING TO END YOUR LIFE?: NO
1. IN THE PAST MONTH, HAVE YOU WISHED YOU WERE DEAD OR WISHED YOU COULD GO TO SLEEP AND NOT WAKE UP?: NO
2. HAVE YOU ACTUALLY HAD ANY THOUGHTS OF KILLING YOURSELF IN THE PAST MONTH?: NO

## 2024-12-05 NOTE — ED PROVIDER NOTES
"Emergency Department Note      History of Present Illness     Chief Complaint   Tailbone Pain      HPI   Zi Mirza is a 33 year old female who presents with her  for a complaint of tailbone pain. Patient reports that she had an onset of pain in her lower back (tailbone area) and upper buttocks yesterday. Pain has continued to worsen through today. She reports that she is unable to sit due to pain, and pain worsens when she is laying on her right side. Pain does not radiate to the lower extremities. She denies any bumps in the region. Notes she was recently doing hip thrusts at gym and may have come down too hard, but did not feel any pain immediately at that time. Otherwise denies any traumas. She has never had pain like this before. Denies any pain in rectal area or hematochezia.  No history of back surgeries.     Independent Historian   None    Review of External Notes   None    Past Medical History     Medical History and Problem List   None    Medications   Etonogestrel     Surgical History   None    Physical Exam     Patient Vitals for the past 24 hrs:   BP Temp Temp src Pulse Resp SpO2 Height Weight   12/05/24 1534 124/82 97  F (36.1  C) Temporal 120 18 100 % 1.753 m (5' 9\") 119.5 kg (263 lb 7.2 oz)     Physical Exam  /82   Pulse 120   Temp 97  F (36.1  C) (Temporal)   Resp 18   Ht 1.753 m (5' 9\")   Wt 119.5 kg (263 lb 7.2 oz)   LMP 11/25/2024   SpO2 100%   BMI 38.90 kg/m     General: Pleasant young female lying prone. Examined in room 42.  Accompanied by .  Head: Atraumatic.   CV: Regular rate.  Respiratory: Breathing comfortably on room air.   Msk: Point tenderness to palpation of the coccyx.  Skin: Warm and dry. No rashes.  No erythema, induration or fluctuance to the gluteal cleft.  Neuro: Awake, alert, and conversant.  Gait stable.  No lower extremity weakness or numbness.    Diagnostics   Lab Results   None    Imaging   XR Sacrum and Coccyx 2 Views   Final Result "   IMPRESSION: Normal joint spaces and alignment. No fracture.        EKG   None    Independent Interpretation   X-ray of the sacrum and coccyx shows no fracture or dislocation    ED Course    Medications Administered   Medications   oxyCODONE-acetaminophen (PERCOCET) 5-325 MG per tablet 1 tablet (1 tablet Oral $Given 12/5/24 1657)   ibuprofen (ADVIL/MOTRIN) tablet 600 mg (600 mg Oral $Given 12/5/24 1657)     Procedures   None     Discussion of Management   None    ED Course   ED Course as of 12/05/24 1812   Thu Dec 05, 2024   1639 I initially assessed the patient and obtained the history and physical exam.    1806 I rechecked and updated the patient. I believe it is safe to discharge the patient. The patient agrees.      Additional Documentation  None    Medical Decision Making / Diagnosis   CMS Diagnoses: None    MIPS   None    [facility] [place]   Zi Mirza is a 33 year old female presenting today for evaluation of tailbone pain.  There was an inciting event yesterday of a exercise at the gym.  Pain is present at the tailbone and radiates up her spine.  Differential diagnosis includes fracture, dislocation, pilonidal cyst, hemorrhoid, coccydynia or contusion.  On exam, she does have point tenderness.  There is no abscess or cyst and she is neurologically intact.  We decided to proceed with x-ray given possible trauma from gym exercise.  There is no evidence of fracture or dislocation.  Suspect that she has coccydynia.  Recommended anti-inflammatories, heat and ice and donut pillow for comfort.  She will follow-up with primary care and will certainly return for worsening of pain, inability to control bowel or bladder or with other concerns.    Disposition   The patient was discharged.     Diagnosis     ICD-10-CM    1. Coccydynia  M53.3            Discharge Medications   New Prescriptions    NAPROXEN (NAPROSYN) 500 MG TABLET    Take 1 tablet (500 mg) by mouth 2 times daily (with meals).       Scribe Disclosure:  Marycarmen FOY  becky Pennington serving as a scribe at 4:32 PM on 12/5/2024 to document services personally performed by Carol Calle PA-C based on my observations and the provider's statements to me.        Carol Calle PA-C  12/06/24 0019

## 2024-12-05 NOTE — ED TRIAGE NOTES
Pt states that she thinks she broke her tailbone.  Denies any falls but thinks she may have injured it at the gym doing hip thrusts.  Pain started yesterday.     Triage Assessment (Adult)       Row Name 12/05/24 1532          Triage Assessment    Airway WDL WDL        Respiratory WDL    Respiratory WDL WDL        Cardiac WDL    Cardiac WDL WDL

## 2024-12-06 NOTE — DISCHARGE INSTRUCTIONS
Naprosyn twice daily with food (do not take ibuprofen while taking this medication)  Use donut pillow  Trial of heat and ice to see which seems to improve pain more  Likely irritation of nerve at the tailbone/coccyx  If you develop any numbness or weakness in the legs, loss of control of bowel or bladder, return to emergency department ASAP

## 2024-12-07 ENCOUNTER — HOSPITAL ENCOUNTER (EMERGENCY)
Facility: CLINIC | Age: 34
Discharge: LEFT BEFORE DISCHARGE | End: 2024-12-08
Attending: EMERGENCY MEDICINE | Admitting: EMERGENCY MEDICINE

## 2024-12-07 VITALS
OXYGEN SATURATION: 97 % | BODY MASS INDEX: 39.25 KG/M2 | RESPIRATION RATE: 20 BRPM | DIASTOLIC BLOOD PRESSURE: 74 MMHG | SYSTOLIC BLOOD PRESSURE: 129 MMHG | TEMPERATURE: 97.7 F | HEIGHT: 69 IN | WEIGHT: 264.99 LBS | HEART RATE: 75 BPM

## 2024-12-07 PROCEDURE — 99281 EMR DPT VST MAYX REQ PHY/QHP: CPT

## 2024-12-07 ASSESSMENT — ACTIVITIES OF DAILY LIVING (ADL): ADLS_ACUITY_SCORE: 46

## 2024-12-08 NOTE — ED TRIAGE NOTES
Pt seen a couple days ago for pain in her lower back/tailbone. Pt reports right before she came to the ER she started having swelling in her feet and toes. Pt c/o tingling also in her toes. Pt denies loss of bowel or bladder control

## (undated) DEVICE — SOL NACL 0.9% IRRIG 1000ML BOTTLE 2F7124

## (undated) DEVICE — SUCTION CANISTER MEDIVAC LINER 3000ML W/LID 65651-530

## (undated) DEVICE — BAG YELLOW TRASH 30.5X43" G4300Y

## (undated) DEVICE — BLADE CLIPPER SGL USE 9680

## (undated) DEVICE — CATH TRAY FOLEY 16FR SILICONE 907416

## (undated) DEVICE — CAST PADDING 4" STERILE 9044S

## (undated) DEVICE — STPL SKIN 35W APPOSE 8886803712

## (undated) DEVICE — LINEN HALF SHEET 5512

## (undated) DEVICE — LINEN TOWEL PACK X10 5473

## (undated) DEVICE — LINEN BABY BLANKET 5434

## (undated) DEVICE — GLOVE PROTEXIS W/NEU-THERA 7.5  2D73TE75

## (undated) DEVICE — BLADE CLIPPER 4406

## (undated) DEVICE — GLOVE PROTEXIS POWDER FREE 6.5 ORTHOPEDIC 2D73ET65

## (undated) DEVICE — GLOVE PROTEXIS POWDER FREE 8.0 ORTHOPEDIC 2D73ET80

## (undated) DEVICE — PACK C-SECTION LF PL15OTA83B

## (undated) DEVICE — GLOVE PROTEXIS POWDER FREE 7.0 ORTHOPEDIC 2D73ET70

## (undated) DEVICE — BAG CLEAR TRASH 1.3M 39X33" P4040C

## (undated) DEVICE — ESU GROUND PAD ADULT W/CORD E7507

## (undated) DEVICE — LINEN FULL SHEET 5511

## (undated) DEVICE — PREP CHLORAPREP 26ML TINTED ORANGE  260815

## (undated) DEVICE — SOL WATER IRRIG 1000ML BOTTLE 2F7114

## (undated) DEVICE — CAP BABY PINK/BLUE IC-2

## (undated) DEVICE — STOCKING SLEEVE VASOPRESS COMPRESSION CALF MED VP501M

## (undated) DEVICE — SOLIDIFIER FLUID RED 1500ML LIQUID KIT SYS POWDER ISOB1500